# Patient Record
Sex: FEMALE | Race: WHITE
[De-identification: names, ages, dates, MRNs, and addresses within clinical notes are randomized per-mention and may not be internally consistent; named-entity substitution may affect disease eponyms.]

---

## 2019-08-17 ENCOUNTER — HOSPITAL ENCOUNTER (EMERGENCY)
Age: 34
Discharge: HOME | End: 2019-08-17
Payer: MEDICARE

## 2019-08-17 VITALS
HEART RATE: 82 BPM | RESPIRATION RATE: 18 BRPM | OXYGEN SATURATION: 99 % | TEMPERATURE: 98.8 F | DIASTOLIC BLOOD PRESSURE: 111 MMHG | SYSTOLIC BLOOD PRESSURE: 178 MMHG

## 2019-08-17 VITALS
RESPIRATION RATE: 18 BRPM | DIASTOLIC BLOOD PRESSURE: 79 MMHG | HEART RATE: 87 BPM | SYSTOLIC BLOOD PRESSURE: 130 MMHG | OXYGEN SATURATION: 96 %

## 2019-08-17 VITALS — DIASTOLIC BLOOD PRESSURE: 97 MMHG | SYSTOLIC BLOOD PRESSURE: 144 MMHG | OXYGEN SATURATION: 97 %

## 2019-08-17 VITALS — BODY MASS INDEX: 54.3 KG/M2

## 2019-08-17 DIAGNOSIS — Z90.49: ICD-10-CM

## 2019-08-17 DIAGNOSIS — R10.11: Primary | ICD-10-CM

## 2019-08-17 DIAGNOSIS — R11.2: ICD-10-CM

## 2019-08-17 LAB
ALANINE AMINOTRANSFER ALT/SGPT: 47 U/L (ref 13–56)
ALBUMIN SERPL-MCNC: 3.4 G/DL (ref 3.2–5)
ALKALINE PHOSPHATASE: 71 U/L (ref 45–117)
ANION GAP: 6 (ref 5–15)
AST(SGOT): 36 U/L (ref 15–37)
BUN SERPL-MCNC: 13 MG/DL (ref 7–18)
BUN/CREAT RATIO: 17.6 RATIO (ref 10–20)
CALCIUM SERPL-MCNC: 9.1 MG/DL (ref 8.5–10.1)
CARBON DIOXIDE: 27 MMOL/L (ref 21–32)
CHLORIDE: 108 MMOL/L (ref 98–107)
DEPRECATED RDW RBC: 39.8 FL (ref 35.1–43.9)
ERYTHROCYTE [DISTWIDTH] IN BLOOD: 11.9 % (ref 11.6–14.6)
EST GLOM FILT RATE - AFR AMER: 117 ML/MIN (ref 60–?)
ESTIMATED CREATININE CLEARANCE: 107.12 ML/MIN
GLOBULIN: 3.8 G/DL (ref 2.2–4.2)
GLUCOSE: 167 MG/DL (ref 74–106)
HCT VFR BLD AUTO: 42.3 % (ref 37–47)
HEMOGLOBIN: 14.8 G/DL (ref 12–15)
HGB BLD-MCNC: 14.8 G/DL (ref 12–15)
IMMATURE GRANULOCYTES COUNT: 0.07 X10^3/UL (ref 0–0)
INTERNAL QC VALIDATED?: (no result)
LIPASE: 78 U/L (ref 73–393)
MCV RBC: 92.4 FL (ref 81–99)
MEAN CORP HGB CONC: 35 G/DL (ref 32–36)
MEAN PLATELET VOL.: 10.2 FL (ref 6.2–12)
MUCOUS THREADS URNS QL MICRO: (no result) /HPF
NRBC FLAGGED BY ANALYZER: 0 % (ref 0–5)
PLATELET # BLD: 206 K/MM3 (ref 150–450)
PLATELET COUNT: 206 K/MM3 (ref 150–450)
POTASSIUM: 4.1 MMOL/L (ref 3.5–5.1)
PROT UR QL STRIP.AUTO: 15 MG/DL
RBC # BLD AUTO: 4.58 M/MM3 (ref 4.2–5.4)
RBC DISTRIBUTION WIDTH CV: 11.9 % (ref 11.6–14.6)
RBC DISTRIBUTION WIDTH SD: 39.8 FL (ref 35.1–43.9)
RBC UR QL: (no result) /HPF (ref 0–5)
SP GR UR: 1.01 (ref 1–1.03)
SQUAMOUS URNS QL MICRO: (no result) /HPF (ref 5–10)
URINE PRESERVATIVE: (no result)
WBC # BLD AUTO: 7.8 K/MM3 (ref 4.4–11)
WHITE BLOOD COUNT: 7.8 K/MM3 (ref 4.4–11)

## 2019-08-17 PROCEDURE — 96361 HYDRATE IV INFUSION ADD-ON: CPT

## 2019-08-17 PROCEDURE — A4216 STERILE WATER/SALINE, 10 ML: HCPCS

## 2019-08-17 PROCEDURE — 80053 COMPREHEN METABOLIC PANEL: CPT

## 2019-08-17 PROCEDURE — 96374 THER/PROPH/DIAG INJ IV PUSH: CPT

## 2019-08-17 PROCEDURE — 96375 TX/PRO/DX INJ NEW DRUG ADDON: CPT

## 2019-08-17 PROCEDURE — 80048 BASIC METABOLIC PNL TOTAL CA: CPT

## 2019-08-17 PROCEDURE — 99285 EMERGENCY DEPT VISIT HI MDM: CPT

## 2019-08-17 PROCEDURE — 81001 URINALYSIS AUTO W/SCOPE: CPT

## 2019-08-17 PROCEDURE — 81025 URINE PREGNANCY TEST: CPT

## 2019-08-17 PROCEDURE — 74177 CT ABD & PELVIS W/CONTRAST: CPT

## 2019-08-17 PROCEDURE — 85025 COMPLETE CBC W/AUTO DIFF WBC: CPT

## 2019-08-17 PROCEDURE — 83690 ASSAY OF LIPASE: CPT

## 2021-09-17 ENCOUNTER — ANESTHESIA EVENT (OUTPATIENT)
Dept: ENDOSCOPY | Age: 36
End: 2021-09-17
Payer: MEDICARE

## 2021-09-20 ENCOUNTER — ANESTHESIA (OUTPATIENT)
Dept: ENDOSCOPY | Age: 36
End: 2021-09-20
Payer: MEDICARE

## 2021-09-20 ENCOUNTER — HOSPITAL ENCOUNTER (OUTPATIENT)
Age: 36
Setting detail: OUTPATIENT SURGERY
Discharge: HOME | End: 2021-09-20
Attending: INTERNAL MEDICINE | Admitting: INTERNAL MEDICINE
Payer: MEDICARE

## 2021-09-20 VITALS
DIASTOLIC BLOOD PRESSURE: 73 MMHG | HEIGHT: 67 IN | WEIGHT: 293 LBS | HEART RATE: 83 BPM | SYSTOLIC BLOOD PRESSURE: 150 MMHG | OXYGEN SATURATION: 96 % | BODY MASS INDEX: 45.99 KG/M2 | RESPIRATION RATE: 79 BRPM | TEMPERATURE: 97.6 F

## 2021-09-20 VITALS
DIASTOLIC BLOOD PRESSURE: 86 MMHG | OXYGEN SATURATION: 98 % | RESPIRATION RATE: 16 BRPM | SYSTOLIC BLOOD PRESSURE: 149 MMHG

## 2021-09-20 DIAGNOSIS — R11.2 NAUSEA AND VOMITING, INTRACTABILITY OF VOMITING NOT SPECIFIED, UNSPECIFIED VOMITING TYPE: ICD-10-CM

## 2021-09-20 DIAGNOSIS — R10.9 ABDOMINAL PAIN, UNSPECIFIED ABDOMINAL LOCATION: ICD-10-CM

## 2021-09-20 LAB
GLUCOSE BLD-MCNC: 207 MG/DL (ref 70–99)
PERFORMED ON: ABNORMAL
PREGNANCY, URINE: NEGATIVE

## 2021-09-20 PROCEDURE — 6360000002 HC RX W HCPCS: Performed by: NURSE ANESTHETIST, CERTIFIED REGISTERED

## 2021-09-20 PROCEDURE — 88305 TISSUE EXAM BY PATHOLOGIST: CPT

## 2021-09-20 PROCEDURE — 6360000002 HC RX W HCPCS

## 2021-09-20 PROCEDURE — 2709999900 HC NON-CHARGEABLE SUPPLY: Performed by: INTERNAL MEDICINE

## 2021-09-20 PROCEDURE — 84703 CHORIONIC GONADOTROPIN ASSAY: CPT

## 2021-09-20 PROCEDURE — 3700000000 HC ANESTHESIA ATTENDED CARE: Performed by: INTERNAL MEDICINE

## 2021-09-20 PROCEDURE — 7100000010 HC PHASE II RECOVERY - FIRST 15 MIN: Performed by: INTERNAL MEDICINE

## 2021-09-20 PROCEDURE — 3609012400 HC EGD TRANSORAL BIOPSY SINGLE/MULTIPLE: Performed by: INTERNAL MEDICINE

## 2021-09-20 PROCEDURE — 7100000011 HC PHASE II RECOVERY - ADDTL 15 MIN: Performed by: INTERNAL MEDICINE

## 2021-09-20 PROCEDURE — 2580000003 HC RX 258: Performed by: ANESTHESIOLOGY

## 2021-09-20 RX ORDER — SUCRALFATE ORAL 1 G/10ML
SUSPENSION ORAL 2 TIMES DAILY
COMMUNITY
Start: 2021-09-09

## 2021-09-20 RX ORDER — LORATADINE 10 MG/1
10 TABLET ORAL DAILY
COMMUNITY

## 2021-09-20 RX ORDER — PREDNISONE 20 MG/1
2 TABLET ORAL DAILY
COMMUNITY
Start: 2021-09-15 | End: 2021-09-21

## 2021-09-20 RX ORDER — OLANZAPINE 10 MG/1
TABLET ORAL DAILY
COMMUNITY
Start: 2021-08-12 | End: 2022-07-19

## 2021-09-20 RX ORDER — FUROSEMIDE 20 MG/1
TABLET ORAL
COMMUNITY
Start: 2021-08-07

## 2021-09-20 RX ORDER — SYRINGE AND NEEDLE,INSULIN,1ML 31GX15/64"
SYRINGE, EMPTY DISPOSABLE MISCELLANEOUS
COMMUNITY
Start: 2021-08-24

## 2021-09-20 RX ORDER — PROPOFOL 10 MG/ML
INJECTION, EMULSION INTRAVENOUS PRN
Status: DISCONTINUED | OUTPATIENT
Start: 2021-09-20 | End: 2021-09-20 | Stop reason: SDUPTHER

## 2021-09-20 RX ORDER — SODIUM CHLORIDE, SODIUM LACTATE, POTASSIUM CHLORIDE, CALCIUM CHLORIDE 600; 310; 30; 20 MG/100ML; MG/100ML; MG/100ML; MG/100ML
INJECTION, SOLUTION INTRAVENOUS CONTINUOUS
Status: DISCONTINUED | OUTPATIENT
Start: 2021-09-20 | End: 2021-09-20 | Stop reason: HOSPADM

## 2021-09-20 RX ORDER — MIDAZOLAM HYDROCHLORIDE 1 MG/ML
2 INJECTION INTRAMUSCULAR; INTRAVENOUS ONCE
Status: COMPLETED | OUTPATIENT
Start: 2021-09-20 | End: 2021-09-20

## 2021-09-20 RX ORDER — INSULIN HUMAN 500 [IU]/ML
INJECTION, SOLUTION SUBCUTANEOUS
COMMUNITY
Start: 2021-08-31

## 2021-09-20 RX ORDER — GABAPENTIN 600 MG/1
800 TABLET ORAL 4 TIMES DAILY
COMMUNITY
Start: 2021-08-12

## 2021-09-20 RX ORDER — CELECOXIB 200 MG/1
CAPSULE ORAL 2 TIMES DAILY
COMMUNITY
Start: 2021-08-11

## 2021-09-20 RX ORDER — ROSUVASTATIN CALCIUM 40 MG/1
40 TABLET, COATED ORAL DAILY
COMMUNITY

## 2021-09-20 RX ORDER — LINAGLIPTIN AND METFORMIN HYDROCHLORIDE 2.5; 1 MG/1; MG/1
1 TABLET, FILM COATED ORAL 2 TIMES DAILY
COMMUNITY

## 2021-09-20 RX ORDER — PANTOPRAZOLE SODIUM 40 MG/1
40 TABLET, DELAYED RELEASE ORAL 2 TIMES DAILY
COMMUNITY

## 2021-09-20 RX ORDER — IPRATROPIUM BROMIDE AND ALBUTEROL SULFATE 2.5; .5 MG/3ML; MG/3ML
3 SOLUTION RESPIRATORY (INHALATION) EVERY 6 HOURS PRN
COMMUNITY

## 2021-09-20 RX ORDER — GLIPIZIDE 5 MG/1
TABLET, FILM COATED, EXTENDED RELEASE ORAL
COMMUNITY
Start: 2021-04-24

## 2021-09-20 RX ORDER — ALPRAZOLAM 2 MG/1
TABLET ORAL 2 TIMES DAILY PRN
COMMUNITY
Start: 2021-08-27 | End: 2022-07-19

## 2021-09-20 RX ORDER — VENLAFAXINE HYDROCHLORIDE 150 MG/1
150 CAPSULE, EXTENDED RELEASE ORAL DAILY
COMMUNITY
Start: 2021-08-06

## 2021-09-20 RX ORDER — LISINOPRIL AND HYDROCHLOROTHIAZIDE 25; 20 MG/1; MG/1
1 TABLET ORAL DAILY
COMMUNITY

## 2021-09-20 RX ORDER — PROMETHAZINE HYDROCHLORIDE 25 MG/1
TABLET ORAL EVERY 8 HOURS PRN
COMMUNITY
Start: 2021-09-11

## 2021-09-20 RX ORDER — INSULIN PUMP CONTROLLER
EACH MISCELLANEOUS
COMMUNITY
Start: 2021-08-23

## 2021-09-20 RX ORDER — NITROGLYCERIN 0.4 MG/1
0.4 TABLET SUBLINGUAL
COMMUNITY

## 2021-09-20 RX ORDER — BUDESONIDE AND FORMOTEROL FUMARATE DIHYDRATE 160; 4.5 UG/1; UG/1
AEROSOL RESPIRATORY (INHALATION) 2 TIMES DAILY
COMMUNITY
Start: 2021-08-27

## 2021-09-20 RX ORDER — BLOOD-GLUCOSE TRANSMITTER
EACH MISCELLANEOUS
COMMUNITY
Start: 2021-09-03

## 2021-09-20 RX ORDER — DICYCLOMINE HCL 20 MG
20 TABLET ORAL 4 TIMES DAILY PRN
COMMUNITY
Start: 2021-06-11

## 2021-09-20 RX ORDER — TIZANIDINE 4 MG/1
TABLET ORAL NIGHTLY
COMMUNITY
Start: 2021-07-23

## 2021-09-20 RX ORDER — MIDAZOLAM HYDROCHLORIDE 1 MG/ML
INJECTION INTRAMUSCULAR; INTRAVENOUS
Status: COMPLETED
Start: 2021-09-20 | End: 2021-09-20

## 2021-09-20 RX ORDER — LIDOCAINE HYDROCHLORIDE 20 MG/ML
INJECTION, SOLUTION INTRAVENOUS PRN
Status: DISCONTINUED | OUTPATIENT
Start: 2021-09-20 | End: 2021-09-20 | Stop reason: SDUPTHER

## 2021-09-20 RX ORDER — CHOLECALCIFEROL (VITAMIN D3) 125 MCG
CAPSULE ORAL NIGHTLY PRN
COMMUNITY
Start: 2021-08-06

## 2021-09-20 RX ADMIN — PROPOFOL 150 MG: 10 INJECTION, EMULSION INTRAVENOUS at 09:58

## 2021-09-20 RX ADMIN — MIDAZOLAM 2 MG: 1 INJECTION INTRAMUSCULAR; INTRAVENOUS at 09:51

## 2021-09-20 RX ADMIN — MIDAZOLAM HYDROCHLORIDE 2 MG: 1 INJECTION INTRAMUSCULAR; INTRAVENOUS at 09:51

## 2021-09-20 RX ADMIN — LIDOCAINE HYDROCHLORIDE 100 MG: 20 INJECTION, SOLUTION INTRAVENOUS at 09:57

## 2021-09-20 RX ADMIN — SODIUM CHLORIDE, POTASSIUM CHLORIDE, SODIUM LACTATE AND CALCIUM CHLORIDE: 600; 310; 30; 20 INJECTION, SOLUTION INTRAVENOUS at 09:47

## 2021-09-20 ASSESSMENT — PULMONARY FUNCTION TESTS
PIF_VALUE: 0
PIF_VALUE: 0
PIF_VALUE: 1
PIF_VALUE: 1
PIF_VALUE: 0
PIF_VALUE: 1
PIF_VALUE: 1
PIF_VALUE: 0
PIF_VALUE: 1
PIF_VALUE: 0
PIF_VALUE: 1
PIF_VALUE: 0
PIF_VALUE: 0
PIF_VALUE: 1
PIF_VALUE: 1
PIF_VALUE: 0
PIF_VALUE: 1
PIF_VALUE: 1
PIF_VALUE: 0
PIF_VALUE: 0

## 2021-09-20 ASSESSMENT — PAIN DESCRIPTION - PROGRESSION: CLINICAL_PROGRESSION: NOT CHANGED

## 2021-09-20 ASSESSMENT — PAIN - FUNCTIONAL ASSESSMENT
PAIN_FUNCTIONAL_ASSESSMENT: 0-10
PAIN_FUNCTIONAL_ASSESSMENT: PREVENTS OR INTERFERES WITH MANY ACTIVE NOT PASSIVE ACTIVITIES

## 2021-09-20 ASSESSMENT — PAIN DESCRIPTION - DESCRIPTORS: DESCRIPTORS: STABBING

## 2021-09-20 ASSESSMENT — PAIN SCALES - GENERAL: PAINLEVEL_OUTOF10: 8

## 2021-09-20 ASSESSMENT — LIFESTYLE VARIABLES: SMOKING_STATUS: 1

## 2021-09-20 ASSESSMENT — PAIN DESCRIPTION - FREQUENCY: FREQUENCY: OTHER (COMMENT)

## 2021-09-20 ASSESSMENT — PAIN DESCRIPTION - LOCATION: LOCATION: ABDOMEN

## 2021-09-20 ASSESSMENT — PAIN DESCRIPTION - PAIN TYPE: TYPE: OTHER (COMMENT)

## 2021-09-20 ASSESSMENT — PAIN DESCRIPTION - ONSET: ONSET: UNABLE TO TELL

## 2021-09-20 NOTE — ANESTHESIA PRE PROCEDURE
Department of Anesthesiology  Preprocedure Note       Name:  Mauro Kearney   Age:  31 y.o.  :  1985                                          MRN:  [id number]         Date:  2021      Surgeon: Hernando Aj):  Sincere Brown MD    Procedure: Procedure(s):  ESOPHAGOGASTRODUODENOSCOPY    Medications prior to admission:   Prior to Admission medications    Medication Sig Start Date End Date Taking? Authorizing Provider   ipratropium-albuterol (DUONEB) 0.5-2.5 (3) MG/3ML SOLN nebulizer solution Inhale 3 mLs into the lungs every 6 hours as needed   Yes Historical Provider, MD   HUMULIN R 500 UNIT/ML concentrated injection vial INJECT 300 UNITS SUBCUTANEOUSLY ONCE DAILY VIA INSULIN PUMP 21  Yes Historical Provider, MD   Insulin Disposable Pump (OMNIPOD DASH 5 PACK PODS) MISC CHANGE EVERY 2 DAYS AS DIRECTED 21  Yes Historical Provider, MD   glipiZIDE (GLUCOTROL XL) 5 MG extended release tablet TAKE 1 TABLET BY MOUTH ONCE DAILY 30 MINUTES BEFORE BREAKFAST 21  Yes Historical Provider, MD   gabapentin (NEURONTIN) 600 MG tablet Take 1 tablet by mouth 3 times daily. 21  Yes Historical Provider, MD   dicyclomine (BENTYL) 20 MG tablet Take 20 mg by mouth 4 times daily as needed 21  Yes Historical Provider, MD   Continuous Blood Gluc Transmit (DEXCOM G6 TRANSMITTER) MISC  9/3/21  Yes Historical Provider, MD   celecoxib (CELEBREX) 200 MG capsule 2 times daily 21  Yes Historical Provider, MD   VENTOLIN  (90 Base) MCG/ACT inhaler every 6 hours as needed 21  Yes Historical Provider, MD   medical marijuana Take by mouth as needed.    Yes Historical Provider, MD   venlafaxine (EFFEXOR XR) 150 MG extended release capsule daily 21   Historical Provider, MD   tiZANidine (ZANAFLEX) 4 MG tablet nightly 21   Historical Provider, MD   sucralfate (CARAFATE) 1 GM/10ML suspension 2 times daily 21   Historical Provider, MD   rosuvastatin (CRESTOR) 40 MG tablet Take 40 mg by mouth daily    Historical Provider, MD   promethazine (PHENERGAN) 25 MG tablet every 8 hours as needed 9/11/21   Historical Provider, MD   predniSONE (DELTASONE) 20 MG tablet Take 2 tablets by mouth daily 9/15/21 9/21/21  Historical Provider, MD   pantoprazole (PROTONIX) 40 MG tablet Take 40 mg by mouth 2 times daily    Historical Provider, MD   OLANZapine (ZYPREXA) 10 MG tablet daily 8/12/21   Historical Provider, MD   nitroGLYCERIN (NITROSTAT) 0.4 MG SL tablet Place 0.4 mg under the tongue    Historical Provider, MD   melatonin 5 MG TABS tablet nightly as needed 8/6/21   Historical Provider, MD   loratadine (CLARITIN) 10 MG tablet Take 10 mg by mouth daily    Historical Provider, MD   lisinopril-hydroCHLOROthiazide (PRINZIDE;ZESTORETIC) 20-25 MG per tablet Take 1 tablet by mouth daily    Historical Provider, MD   linagliptin-metFORMIN (JENTADUETO) 2.5-1000 MG TABS Take 1 tablet by mouth 2 times daily    Historical Provider, MD   [address] [facility] [address] X 15/64\" 0.5 ML MISC USE 1 SYRINGE THREE TIMES DAILY IN CASE OF PUMP FAILURE 8/24/21   Historical Provider, MD   furosemide (LASIX) 20 MG tablet take 1/2 to 1 tablet by mouth once daily if needed 8/7/21   Historical Provider, MD   diphenhydrAMINE (SOMINEX) 25 MG tablet Take 25 mg by mouth nightly    Historical Provider, MD   SYMBICORT 160-4.5 MCG/ACT AERO 2 times daily 8/27/21   Historical Provider, MD   ALPRAZolam Donald Nasuti) 2 MG tablet 2 times daily as needed. 8/27/21   Historical Provider, MD       Current medications:    Current Facility-Administered Medications   Medication Dose Route Frequency Provider Last Rate Last Admin    lactated ringers infusion   IntraVENous Continuous Martene Steven, DO        midazolam (VERSED) injection 2 mg  2 mg IntraVENous Once Martene Steven, DO           Allergies:     Allergies   Allergen Reactions    Cefaclor Anaphylaxis     PATIENT SAYS SHE CAN TOLERATE PENICILLIN      Cephalosporins Anaphylaxis     PATIENT SAYS SHE CAN TOLERATE PENICILLIN      Haloperidol Anxiety and Other (See Comments)     * panic attacks         Haloperidol Lactate Anxiety     * panic attacks     Metoclopramide Anxiety and Other (See Comments)     Panic attack         Nabumetone Anxiety and Other (See Comments)     Panic attack      Prochlorperazine Anxiety and Other (See Comments)     Panic attack        Sumatriptan Anxiety     * panic attacks          Corticosteroids Other (See Comments)     Elevate blood sugar \"dangerously\"    Adhesive Tape Rash    Duloxetine Nausea And Vomiting    Duloxetine Hcl Nausea And Vomiting    Gabapentin Nausea And Vomiting    Lidoderm [Lidocaine] Rash     Rash with Lidoderm patch       Problem List:  There is no problem list on file for this patient.       Past Medical History:        Diagnosis Date    Anxiety     Arthritis     Asthma     Bipolar 2 disorder ([facility]     [facility]     Diabetes mellitus (HCC)     Fibromyalgia     Hyperlipidemia     Hypertension     MVP (mitral valve prolapse)     Prolonged emergence from general anesthesia     Sleep apnea     wears CPAP       Past Surgical History:        Procedure Laterality Date    BACK SURGERY       SECTION, LOW TRANSVERSE      CHOLECYSTECTOMY      ENDOSCOPY, COLON, DIAGNOSTIC      FRACTURE SURGERY      KNEE SURGERY Left        Social History:    Social History     Tobacco Use    Smoking status: Current Every Day Smoker     Packs/day: 1.00     Years: 22.00     Pack years: 22.00    Smokeless tobacco: Former User   Substance Use Topics    Alcohol use: Yes     Comment: social                                Ready to quit: Not Answered  Counseling given: Not Answered      Vital Signs (Current):   Vitals:    21 0847   BP: (!) 154/95   Pulse: 93   Resp: 18   Temp: 98.3 °F (36.8 °C)   TempSrc: Temporal   SpO2: 96%   Weight: (!) 380 lb (172.4 kg)   Height: 5' 7\" (1.702 m) BP Readings from Last 3 Encounters:   09/20/21 (!) 154/95       NPO Status: Time of last liquid consumption: 2300                        Time of last solid consumption: 2300                        Date of last liquid consumption: 09/19/21                        Date of last solid food consumption: 09/19/21    BMI:   Wt Readings from Last 3 Encounters:   09/20/21 (!) 380 lb (172.4 kg)     Body mass index is 59.52 kg/m². CBC: No results found for: WBC, RBC, HGB, HCT, MCV, RDW, PLT    CMP: No results found for: NA, K, CL, CO2, BUN, CREATININE, GFRAA, AGRATIO, LABGLOM, GLUCOSE, PROT, CALCIUM, BILITOT, ALKPHOS, AST, ALT    POC Tests: No results for input(s): POCGLU, POCNA, POCK, POCCL, POCBUN, POCHEMO, POCHCT in the last 72 hours. Coags: No results found for: PROTIME, INR, APTT    HCG (If Applicable):   Lab Results   Component Value Date    PREGTESTUR Negative 09/20/2021        ABGs: No results found for: PHART, PO2ART, LSH1KSY, GBF3PYT, BEART, W5VSTJGI     Type & Screen (If Applicable):  No results found for: LABABO, LABRH    Drug/Infectious Status (If Applicable):  No results found for: HIV, HEPCAB    COVID-19 Screening (If Applicable): No results found for: COVID19        Anesthesia Evaluation  Patient summary reviewed and Nursing notes reviewed no history of anesthetic complications:   Airway: Mallampati: II  TM distance: >3 FB   Neck ROM: limited  Mouth opening: > = 3 FB Dental:          Pulmonary:   (+) COPD:  sleep apnea: on CPAP,  current smoker                           Cardiovascular:    (+) hypertension:,         Rhythm: regular  Rate: normal                    Neuro/Psych:                ROS comment: Bipolar disorder GI/Hepatic/Renal:   (+) morbid obesity         ROS comment: N/V . Endo/Other:    (+) DiabetesType II DM, using insulin, . Abdominal:             Vascular:           Other Findings:             Anesthesia Plan      MAC     ASA 3       Induction: intravenous. MIPS: Prophylactic antiemetics administered. Anesthetic plan and risks discussed with patient. Plan discussed with CRNA.                   Yisel Jackson,    9/20/2021

## 2021-09-20 NOTE — H&P
History and Physical / Pre-Sedation Assessment    Patient:  Ernie Saez   :   1985     Intended Procedure:  EGD    HPI: Nausea, Vomiting, Abd pain    Nurses notes reviewed and agreed. Medications reviewed  Allergies: Allergies   Allergen Reactions    Cefaclor Anaphylaxis     PATIENT SAYS SHE CAN TOLERATE PENICILLIN      Cephalosporins Anaphylaxis     PATIENT SAYS SHE CAN TOLERATE PENICILLIN      Haloperidol Anxiety and Other (See Comments)     * panic attacks         Haloperidol Lactate Anxiety     * panic attacks     Metoclopramide Anxiety and Other (See Comments)     Panic attack         Nabumetone Anxiety and Other (See Comments)     Panic attack      Prochlorperazine Anxiety and Other (See Comments)     Panic attack        Sumatriptan Anxiety     * panic attacks          Corticosteroids Other (See Comments)     Elevate blood sugar \"dangerously\"    Adhesive Tape Rash    Duloxetine Nausea And Vomiting    Duloxetine Hcl Nausea And Vomiting    Gabapentin Nausea And Vomiting    Lidoderm [Lidocaine] Rash     Rash with Lidoderm patch       Physical Exam:  Vital Signs: BP (!) 154/95   Pulse 93   Temp 98.3 °F (36.8 °C) (Temporal)   Resp 18   Ht 5' 7\" (1.702 m)   Wt (!) 380 lb (172.4 kg)   SpO2 96%   BMI 59.52 kg/m²    Airway: Mallampati: III (soft palate, base of uvula visible)  Pulmonary:Normal  Cardiac:Normal  Abdomen:Abnormal  Surgery scars of TL, C/S and GB surgery    Pre-Procedure Assessment / Plan:  ASA: Class 3 - A patient with severe systemic disease that limits activity but is not incapacitating  Level of Sedation Plan: per anesthesia  Post Procedure plan: Return to same level of care    I assessed the patient and find that the patient is in satisfactory condition to proceed with the planned procedure and sedation plan. I have explained the risk, benefits, and alternatives to the procedure; the patient understands and agrees to proceed.        Jasson Smith, MD  9/20/2021

## 2021-09-20 NOTE — PROGRESS NOTES
Ambulatory Surgery/Procedure Discharge Note    Vitals:    09/20/21 1047   BP: (!) 150/73   Pulse: 83   Resp: (!) 79   Temp:    SpO2: 96%       In: 300 [I.V.:300]  Out: -     Restroom use offered before discharge. Yes    Pain assessment:  present - adequately treated  Pain Level: 8        Patient discharged to home/self care. Patient discharged via wheel chair by transporter to waiting family/S.O.       9/20/2021     Pt. Tolerated procedure well. Denies pain &/or nausea post procedure. Discharge instructions reviewed with patient verbalized understanding and have a written copy. Patient left via wheelchair to go home with family member via land transportation.

## 2021-09-20 NOTE — BRIEF OP NOTE
Brief Postoperative Note      Patient: Kimberly Neumann  YOB: 1985  MRN: [id number]    Date of Procedure: 9/20/2021    Pre-Op Diagnosis: Nausea and vomiting, intractability of vomiting not specified, unspecified vomiting type [R11.2] Abdominal pain, unspecified abdominal location [R10.9]    Post-Op Diagnosis: ?  Short Odom's, Gastritis       Procedure(s):  ESOPHAGOGASTRODUODENOSCOPY    Surgeon(s):  Jose A Munoz MD    Assistant:  * No surgical staff found *    Anesthesia: Monitor Anesthesia Care    Estimated Blood Loss (mL): Minimal    Complications: None    Specimens:   ID Type Source Tests Collected by Time Destination   A : antrum r/o HP  Antrum Antrum SURGICAL PATHOLOGY Jose A Munoz MD 9/20/2021 1003        Implants:  * No implants in log *      Drains: * No LDAs found *    Findings: As above, Dictated    Electronically signed by Jose A Munoz MD on 9/20/2021 at 10:05 AM

## 2021-09-20 NOTE — ANESTHESIA POSTPROCEDURE EVALUATION
Department of Anesthesiology  Postprocedure Note    Patient: Heather Nava  MRN: [id number]  [place]: 1985  Date of evaluation: 9/20/2021  Time:  12:15 PM     Procedure Summary     Date: 09/20/21 Room / Location: [facility]    Anesthesia Start: 4281 Anesthesia Stop:     Procedure: ESOPHAGOGASTRODUODENOSCOPY (N/A ) Diagnosis:       Nausea and vomiting, intractability of vomiting not specified, unspecified vomiting type      Abdominal pain, unspecified abdominal location      (Nausea and vomiting, intractability of vomiting not specified, unspecified vomiting type [R11.2] Abdominal pain, unspecified abdominal location [R10.9])    Surgeons: Ann Marie Jewell MD Responsible Provider: Cleveland Amado DO    Anesthesia Type: MAC ASA Status: 3          Anesthesia Type: No value filed. Marissa Phase I: Marissa Score: 10    Marissa Phase II: Marissa Score: 10    Last vitals: Reviewed and per EMR flowsheets.        Anesthesia Post Evaluation    Patient location during evaluation: PACU  Patient participation: complete - patient participated  Level of consciousness: awake and alert  Airway patency: patent  Nausea & Vomiting: no nausea and no vomiting  Cardiovascular status: blood pressure returned to baseline  Respiratory status: acceptable  Hydration status: euvolemic

## 2021-09-21 NOTE — PROCEDURES
[address]               [address] [facility], [address] [place] [address] Ave                                 PROCEDURE NOTE    PATIENT NAME: Darshana Núñeztingham                    :        1985  MED REC NO:   [id number]                          ROOM:  ACCOUNT NO:   [de-identified]                           ADMIT DATE: 2021  PROVIDER:     Cruz Adam MD    DATE OF PROCEDURE:  2021    ENDOSCOPY REPORT    She was an outpatient. PROCEDURE PERFORMED:  EGD with biopsies x2 and pictures. SURGEON:  Cruz Adam MD    INDICATION FOR PROCEDURE:  This is a rather anxious 62-year-old white  female who recently saw me in the GI Clinic with her main symptoms of  central abdominal pain and right upper quadrant pain, recurrent episodes  of nausea and vomiting who gives a prior history of gastroparesis  possibly related to diabetes which has been there since she was a  teenager. ANALGESIA:  Analgesia was provided by Anesthesia service given her  significant anxiety, number of medications she takes, pathological  obesity with BMI over 54. Please see their note. PROCEDURE NOTE:  She had given informed consent earlier and was  monitored in the standard fashion and did well. EGD NOTE:  In a fasting state, in the left lateral decubitus position,  an upper GI Olympus video endoscope was advanced from the oropharynx to  the second portion of the duodenum. There was no major difficulty doing  it. There are questionable changes suggestive of Odom's esophagus,  so biopsies were done from the distal esophagus. The patient has a long  history of acid reflux-type symptoms, but no acute or active esophagitis  was seen at this time, that may be because she has been on pantoprazole  chronically. The stomach did, however, show moderate gastritis and  biopsies were done to the body and the antrum. No peptic ulcer was  seen.   Pylorus was symmetrically and widely opened. The duodenum was  normal.  Retroflexed view of the fundus did not show any significant  change. Estimated blood loss during the procedure is less than 2 mL. The patient tolerated the exam well. IMPRESSION:  1. Questionable short segment Odom's esophagus. Pictures taken. Biopsies done and awaited. No active esophagitis or hiatal hernia. 2.  Mild-to-moderate gastritis involving antrum and body. Biopsies done  and awaited. No peptic ulcer in the stomach. Inflammatory changes are  probably due to use of Celebrex. 3.  History of gastroparesis. Stomach seems to be moving well. No  bezoar. No obstruction. 4.  Normal duodenum. PLAN:  Above findings were explained to the patient when she was awake. Findings were discussed with her  and will be communicated to her  PCP, Dr. Carin Garza in [address] [place] [address]. At present, she is on pantoprazole 40  mg daily which I would suggest be continued. The biopsies may throw  some further light and if she has Helicobacter pylori, she will be  treated appropriately. She does give a history of regular marijuana  use, but her symptoms apparently antedate use of marijuana. A repeat  gastric emptying scan may also be in order. I will see her in the GI  Clinic in [place] in about two weeks time to sort these issues out. As mentioned, estimated blood loss during the procedure was less than 2  mL.         Rajni Kennedy MD    D: 09/20/2021 18:03:28       T: 09/21/2021 1:28:49     [id number]  Job#: [id number]     Doc#: [id number]    CC:  Todd Joseph Md

## 2022-07-19 ENCOUNTER — HOSPITAL ENCOUNTER (EMERGENCY)
Age: 37
Discharge: HOME | End: 2022-07-19
Attending: EMERGENCY MEDICINE
Payer: MEDICARE

## 2022-07-19 ENCOUNTER — APPOINTMENT (OUTPATIENT)
Dept: GENERAL RADIOLOGY | Age: 37
End: 2022-07-19
Payer: MEDICARE

## 2022-07-19 VITALS
WEIGHT: 293 LBS | DIASTOLIC BLOOD PRESSURE: 70 MMHG | OXYGEN SATURATION: 96 % | HEART RATE: 102 BPM | TEMPERATURE: 99.1 F | SYSTOLIC BLOOD PRESSURE: 106 MMHG | HEIGHT: 67 IN | RESPIRATION RATE: 16 BRPM | BODY MASS INDEX: 45.99 KG/M2

## 2022-07-19 DIAGNOSIS — R10.84 GENERALIZED ABDOMINAL PAIN: Primary | ICD-10-CM

## 2022-07-19 DIAGNOSIS — R19.7 DIARRHEA, UNSPECIFIED TYPE: ICD-10-CM

## 2022-07-19 LAB
A/G RATIO: 1.4 (ref 1.1–2.2)
ALBUMIN SERPL-MCNC: 5 G/DL (ref 3.4–5)
ALP BLD-CCNC: 76 U/L (ref 40–129)
ALT SERPL-CCNC: 44 U/L (ref 10–40)
ANION GAP SERPL CALCULATED.3IONS-SCNC: 14 MMOL/L (ref 3–16)
AST SERPL-CCNC: 37 U/L (ref 15–37)
BACTERIA: ABNORMAL /HPF
BASOPHILS ABSOLUTE: 0 K/UL (ref 0–0.2)
BASOPHILS RELATIVE PERCENT: 0.3 %
BILIRUB SERPL-MCNC: 1.8 MG/DL (ref 0–1)
BILIRUBIN URINE: NEGATIVE
BLOOD, URINE: NEGATIVE
BUN BLDV-MCNC: 10 MG/DL (ref 7–20)
CALCIUM SERPL-MCNC: 10.7 MG/DL (ref 8.3–10.6)
CHLORIDE BLD-SCNC: 94 MMOL/L (ref 99–110)
CLARITY: CLEAR
CO2: 25 MMOL/L (ref 21–32)
COLOR: YELLOW
CREAT SERPL-MCNC: 0.6 MG/DL (ref 0.6–1.1)
EKG ATRIAL RATE: 114 BPM
EKG DIAGNOSIS: NORMAL
EKG P AXIS: 62 DEGREES
EKG P-R INTERVAL: 168 MS
EKG Q-T INTERVAL: 340 MS
EKG QRS DURATION: 78 MS
EKG QTC CALCULATION (BAZETT): 468 MS
EKG R AXIS: -12 DEGREES
EKG T AXIS: 65 DEGREES
EKG VENTRICULAR RATE: 114 BPM
EOSINOPHILS ABSOLUTE: 0.3 K/UL (ref 0–0.6)
EOSINOPHILS RELATIVE PERCENT: 3.1 %
EPITHELIAL CELLS, UA: ABNORMAL /HPF (ref 0–5)
GFR AFRICAN AMERICAN: >60
GFR NON-AFRICAN AMERICAN: >60
GLUCOSE BLD-MCNC: 255 MG/DL (ref 70–99)
GLUCOSE URINE: >=1000 MG/DL
HCG QUALITATIVE: NEGATIVE
HCT VFR BLD CALC: 44.8 % (ref 36–48)
HEMOGLOBIN: 15.9 G/DL (ref 12–16)
KETONES, URINE: 15 MG/DL
LACTIC ACID: 2 MMOL/L (ref 0.4–2)
LEUKOCYTE ESTERASE, URINE: NEGATIVE
LIPASE: 16 U/L (ref 13–60)
LYMPHOCYTES ABSOLUTE: 2.5 K/UL (ref 1–5.1)
LYMPHOCYTES RELATIVE PERCENT: 30.3 %
MCH RBC QN AUTO: 31.9 PG (ref 26–34)
MCHC RBC AUTO-ENTMCNC: 35.5 G/DL (ref 31–36)
MCV RBC AUTO: 89.8 FL (ref 80–100)
MICROSCOPIC EXAMINATION: ABNORMAL
MONOCYTES ABSOLUTE: 0.5 K/UL (ref 0–1.3)
MONOCYTES RELATIVE PERCENT: 6 %
MUCUS: ABNORMAL /LPF
NEUTROPHILS ABSOLUTE: 5 K/UL (ref 1.7–7.7)
NEUTROPHILS RELATIVE PERCENT: 60.3 %
NITRITE, URINE: NEGATIVE
PDW BLD-RTO: 14 % (ref 12.4–15.4)
PH UA: 6.5 (ref 5–8)
PLATELET # BLD: 233 K/UL (ref 135–450)
PMV BLD AUTO: 8.7 FL (ref 5–10.5)
POTASSIUM REFLEX MAGNESIUM: 3.9 MMOL/L (ref 3.5–5.1)
PRO-BNP: 10 PG/ML (ref 0–124)
PROTEIN UA: NEGATIVE MG/DL
RBC # BLD: 4.98 M/UL (ref 4–5.2)
RBC UA: ABNORMAL /HPF (ref 0–4)
SODIUM BLD-SCNC: 133 MMOL/L (ref 136–145)
SPECIFIC GRAVITY UA: 1.01 (ref 1–1.03)
TOTAL PROTEIN: 8.6 G/DL (ref 6.4–8.2)
TROPONIN: <0.01 NG/ML
URINE TYPE: ABNORMAL
UROBILINOGEN, URINE: 1 E.U./DL
WBC # BLD: 8.2 K/UL (ref 4–11)
WBC UA: ABNORMAL /HPF (ref 0–5)

## 2022-07-19 PROCEDURE — 84703 CHORIONIC GONADOTROPIN ASSAY: CPT

## 2022-07-19 PROCEDURE — 93005 ELECTROCARDIOGRAM TRACING: CPT | Performed by: NURSE PRACTITIONER

## 2022-07-19 PROCEDURE — 93010 ELECTROCARDIOGRAM REPORT: CPT | Performed by: INTERNAL MEDICINE

## 2022-07-19 PROCEDURE — 99285 EMERGENCY DEPT VISIT HI MDM: CPT

## 2022-07-19 PROCEDURE — 83880 ASSAY OF NATRIURETIC PEPTIDE: CPT

## 2022-07-19 PROCEDURE — 96376 TX/PRO/DX INJ SAME DRUG ADON: CPT

## 2022-07-19 PROCEDURE — 36415 COLL VENOUS BLD VENIPUNCTURE: CPT

## 2022-07-19 PROCEDURE — 85025 COMPLETE CBC W/AUTO DIFF WBC: CPT

## 2022-07-19 PROCEDURE — 84484 ASSAY OF TROPONIN QUANT: CPT

## 2022-07-19 PROCEDURE — 2580000003 HC RX 258: Performed by: NURSE PRACTITIONER

## 2022-07-19 PROCEDURE — 83605 ASSAY OF LACTIC ACID: CPT

## 2022-07-19 PROCEDURE — 96375 TX/PRO/DX INJ NEW DRUG ADDON: CPT

## 2022-07-19 PROCEDURE — 6360000002 HC RX W HCPCS: Performed by: NURSE PRACTITIONER

## 2022-07-19 PROCEDURE — 81001 URINALYSIS AUTO W/SCOPE: CPT

## 2022-07-19 PROCEDURE — 83690 ASSAY OF LIPASE: CPT

## 2022-07-19 PROCEDURE — 71045 X-RAY EXAM CHEST 1 VIEW: CPT

## 2022-07-19 PROCEDURE — 96374 THER/PROPH/DIAG INJ IV PUSH: CPT

## 2022-07-19 PROCEDURE — 80053 COMPREHEN METABOLIC PANEL: CPT

## 2022-07-19 PROCEDURE — 6370000000 HC RX 637 (ALT 250 FOR IP): Performed by: NURSE PRACTITIONER

## 2022-07-19 RX ORDER — ONDANSETRON 4 MG/1
4 TABLET, ORALLY DISINTEGRATING ORAL EVERY 8 HOURS PRN
Qty: 6 TABLET | Refills: 0 | Status: SHIPPED | OUTPATIENT
Start: 2022-07-19

## 2022-07-19 RX ORDER — VENLAFAXINE 75 MG/1
75 TABLET ORAL 3 TIMES DAILY
COMMUNITY

## 2022-07-19 RX ORDER — ONDANSETRON 2 MG/ML
4 INJECTION INTRAMUSCULAR; INTRAVENOUS ONCE
Status: COMPLETED | OUTPATIENT
Start: 2022-07-19 | End: 2022-07-19

## 2022-07-19 RX ORDER — MORPHINE SULFATE 4 MG/ML
4 INJECTION, SOLUTION INTRAMUSCULAR; INTRAVENOUS ONCE
Status: COMPLETED | OUTPATIENT
Start: 2022-07-19 | End: 2022-07-19

## 2022-07-19 RX ORDER — HYDROCODONE BITARTRATE AND ACETAMINOPHEN 5; 325 MG/1; MG/1
1 TABLET ORAL ONCE
Status: COMPLETED | OUTPATIENT
Start: 2022-07-19 | End: 2022-07-19

## 2022-07-19 RX ORDER — 0.9 % SODIUM CHLORIDE 0.9 %
1000 INTRAVENOUS SOLUTION INTRAVENOUS ONCE
Status: COMPLETED | OUTPATIENT
Start: 2022-07-19 | End: 2022-07-19

## 2022-07-19 RX ORDER — ONDANSETRON 2 MG/ML
4 INJECTION INTRAMUSCULAR; INTRAVENOUS EVERY 6 HOURS PRN
Status: DISCONTINUED | OUTPATIENT
Start: 2022-07-19 | End: 2022-07-19 | Stop reason: HOSPADM

## 2022-07-19 RX ADMIN — SODIUM CHLORIDE 1000 ML: 9 INJECTION, SOLUTION INTRAVENOUS at 19:27

## 2022-07-19 RX ADMIN — ONDANSETRON HYDROCHLORIDE 4 MG: 2 INJECTION, SOLUTION INTRAMUSCULAR; INTRAVENOUS at 19:28

## 2022-07-19 RX ADMIN — HYDROCODONE BITARTRATE AND ACETAMINOPHEN 1 TABLET: 5; 325 TABLET ORAL at 20:17

## 2022-07-19 RX ADMIN — MORPHINE SULFATE 4 MG: 4 INJECTION INTRAVENOUS at 19:28

## 2022-07-19 RX ADMIN — ONDANSETRON HYDROCHLORIDE 4 MG: 2 INJECTION, SOLUTION INTRAMUSCULAR; INTRAVENOUS at 20:19

## 2022-07-19 ASSESSMENT — ENCOUNTER SYMPTOMS
VOMITING: 0
BLOOD IN STOOL: 0
BACK PAIN: 0
SHORTNESS OF BREATH: 0
SORE THROAT: 0
COUGH: 0
RHINORRHEA: 0
EYE PAIN: 0
ABDOMINAL PAIN: 1
NAUSEA: 0
DIARRHEA: 0

## 2022-07-19 ASSESSMENT — PAIN SCALES - GENERAL
PAINLEVEL_OUTOF10: 8
PAINLEVEL_OUTOF10: 9

## 2022-07-19 ASSESSMENT — PAIN - FUNCTIONAL ASSESSMENT: PAIN_FUNCTIONAL_ASSESSMENT: 0-10

## 2022-07-19 ASSESSMENT — PAIN DESCRIPTION - LOCATION: LOCATION: ABDOMEN

## 2022-07-19 NOTE — ED PROVIDER NOTES
I independently performed a history and physical on Noemi Joe. All diagnostic, treatment, and disposition decisions were made by myself in conjunction with the advanced practice provider. For further details of [address] [place] [address] emergency department encounter, please see Delmer Quiñonez NP's documentation. Patient reports has had 3 weeks of vomiting and diarrhea off and on. She has been to the ER \"multiple times\" for this. She also complains of intermittent fevers but none at this time. No chest pain or shortness of breath. She reports vomiting and diarrhea are both not bloody nor black. She states she has had CAT scans and other studies done but this was all done at facilities that do not share records with [facility]. She reports the pain is primarily on the right side of the abdomen and indicates right upper quadrant and right flank. No chest pain. She is status post cholecystectomy. On exam patient is morbidly obese and has bulging of the abdominal wall in the area indicating by her pain. This is consistent with some weakening of the abdominal wall/ventral hernia in my opinion. There is no incarcerated hernia. EKG  The Ekg interpreted by me shows  sinus tachycardia, nmlb=180  Axis is   Normal  QTc is  normal  Intervals and Durations are unremarkable. ST Segments: normal  No prior EKG available for comparison.   Results for orders placed or performed during the hospital encounter of 07/19/22   CBC with Auto Differential   Result Value Ref Range    WBC 8.2 4.0 - 11.0 K/uL    RBC 4.98 4.00 - 5.20 M/uL    Hemoglobin 15.9 12.0 - 16.0 g/dL    Hematocrit 44.8 36.0 - 48.0 %    MCV 89.8 80.0 - 100.0 fL    MCH 31.9 26.0 - 34.0 pg    MCHC 35.5 31.0 - 36.0 g/dL    RDW 14.0 12.4 - 15.4 %    Platelets 885 452 - 186 K/uL    MPV 8.7 5.0 - 10.5 fL    Neutrophils % 60.3 %    Lymphocytes % 30.3 %    Monocytes % 6.0 %    Eosinophils % 3.1 %    Basophils % 0.3 %    Neutrophils Absolute 5.0 1.7 - 7.7 K/uL    Lymphocytes Absolute 2.5 1.0 - 5.1 K/uL    Monocytes Absolute 0.5 0.0 - 1.3 K/uL    Eosinophils Absolute 0.3 0.0 - 0.6 K/uL    Basophils Absolute 0.0 0.0 - 0.2 K/uL   Comprehensive Metabolic Panel w/ Reflex to MG   Result Value Ref Range    Sodium 133 (L) 136 - 145 mmol/L    Potassium reflex Magnesium 3.9 3.5 - 5.1 mmol/L    Chloride 94 (L) 99 - 110 mmol/L    CO2 25 21 - 32 mmol/L    Anion Gap 14 3 - 16    Glucose 255 (H) 70 - 99 mg/dL    BUN 10 7 - 20 mg/dL    CREATININE 0.6 0.6 - 1.1 mg/dL    GFR Non-African American >60 >60    GFR African American >60 >60    Calcium 10.7 (H) 8.3 - 10.6 mg/dL    Total Protein 8.6 (H) 6.4 - 8.2 g/dL    Albumin 5.0 3.4 - 5.0 g/dL    Albumin/Globulin Ratio 1.4 1.1 - 2.2    Total Bilirubin 1.8 (H) 0.0 - 1.0 mg/dL    Alkaline Phosphatase 76 40 - 129 U/L    ALT 44 (H) 10 - 40 U/L    AST 37 15 - 37 U/L   Troponin   Result Value Ref Range    Troponin <0.01 <0.01 ng/mL   Brain Natriuretic Peptide   Result Value Ref Range    Pro-BNP 10 0 - 124 pg/mL   Lipase   Result Value Ref Range    Lipase 16.0 13.0 - 60.0 U/L   HCG Qualitative, Serum   Result Value Ref Range    hCG Qual Negative Detects HCG level >10 MIU/mL   Lactic Acid   Result Value Ref Range    Lactic Acid 2.0 0.4 - 2.0 mmol/L   Urinalysis with Microscopic   Result Value Ref Range    Color, UA Yellow Straw/Yellow    Clarity, UA Clear Clear    Glucose, Ur >=1000 (A) Negative mg/dL    Bilirubin Urine Negative Negative    Ketones, Urine 15 (A) Negative mg/dL    Specific Gravity, UA 1.010 1.005 - 1.030    Blood, Urine Negative Negative    pH, UA 6.5 5.0 - 8.0    Protein, UA Negative Negative mg/dL    Urobilinogen, Urine 1.0 <2.0 E.U./dL    Nitrite, Urine Negative Negative    Leukocyte Esterase, Urine Negative Negative    Microscopic Examination Not Indicated     Urine Type NotGiven     Mucus, UA Rare (A) None Seen /LPF    WBC, UA 0-2 0 - 5 /HPF    RBC, UA 0-2 0 - 4 /HPF    Epithelial Cells, UA 0-1 0 - 5 /HPF    Bacteria, UA Rare (A) None Seen /HPF   EKG 12 Lead   Result Value Ref Range    Ventricular Rate 114 BPM    Atrial Rate 114 BPM    P-R Interval 168 ms    QRS Duration 78 ms    Q-T Interval 340 ms    QTc Calculation (Bazett) 468 ms    P Axis 62 degrees    R Axis -12 degrees    T Axis 65 degrees    Diagnosis       Sinus tachycardiaInferior infarct , age undeterminedCannot rule out Anterior infarct , age undeterminedAbnormal ECGNo previous ECGs availableConfirmed by Lizzy Carney MD, [address] (1984) on 7/19/2022 7:49:40 PM     XR CHEST PORTABLE    Result Date: 7/19/2022  EXAMINATION: ONE XRAY VIEW OF THE CHEST 7/19/2022 7:11 pm COMPARISON: None. HISTORY: ORDERING SYSTEM PROVIDED HISTORY: Right upper quadrant abdominal pain TECHNOLOGIST PROVIDED HISTORY: Reason for exam:->Right upper quadrant abdominal pain Reason for Exam: Right upper quadrant abdominal pain FINDINGS: Portable study is limited by obesity. Heart size and configuration are within normal limits and the lungs are clear. No pneumothorax or pleural fluid. No acute bone finding. No acute cardiopulmonary disease. I personally saw this patient and performed a substantive portion of the visit including all aspects of the medical decision making. MDM  I believe the patient's pain is likely secondary to a broad-based ventral hernia. Regarding her vomiting and diarrhea I believe she would benefit from GI consultation. At this time her labs are mostly within normal limits I believe she is safe for discharge. I personally saw this patient and independently provided 10 minutes of non-concurrent critical care out of the total shared critical care time provided.        Austen Wei MD  07/19/22 5993

## 2022-07-19 NOTE — ED PROVIDER NOTES
[facility]        Pt Name: Lorrine Lesch  MRN: [id number]  [place] 1985  Date of evaluation: 7/19/2022  Provider: LIZETTE Hassan - CNP  PCP: Lizzy Massey MD  Note Started: 6:50 PM EDT      GABY. I have evaluated this patient. My supervising physician was available for consultation. Triage CHIEF COMPLAINT       Chief Complaint   Patient presents with    Abdominal Pain     Pt having abd pain for the past month or more. States that she was recently diagnosed with cirrhosis from her fatty liver. Pt having RUQ pain today. HISTORY OF PRESENT ILLNESS   (Location/Symptom, Timing/Onset, Context/Setting, Quality, Duration, Modifying Factors, Severity)  Note limiting factors. Chief Complaint: Abdominal pain for 1 month    Lorrine Lesch is a 31 y.o. female who presents to the [facility] from with symptoms of nausea and abdominal pain for approximately 1 month. Does have a history of fatty liver disease. Reported that she is also had a history of cholecystectomy. Her pain is located in the upper abdomen. Denies cough or congestion. States that she felt feverish. Denies any symptoms of neck pain or back pain. No cough or congestion. No chest pain. Denies any lower abdominal pain. No vaginal bleeding or discharge. Nursing Notes were all reviewed and agreed with or any disagreements were addressed in the HPI. REVIEW OF SYSTEMS    (2-9 systems for level 4, 10 or more for level 5)     Review of Systems   Constitutional:  Negative for chills, diaphoresis and fever. HENT:  Negative for congestion, ear pain, rhinorrhea and sore throat. Eyes:  Negative for pain and visual disturbance. Respiratory:  Negative for cough and shortness of breath. Cardiovascular:  Negative for chest pain and leg swelling. Gastrointestinal:  Positive for abdominal pain. Negative for blood in stool, diarrhea, nausea and vomiting.    Genitourinary: Negative for difficulty urinating, dysuria, flank pain and frequency. Musculoskeletal:  Negative for back pain and neck pain. Skin:  Negative for rash and wound. Neurological:  Negative for dizziness and light-headedness. PAST MEDICAL HISTORY     Past Medical History:   Diagnosis Date    Anxiety     Arthritis     Asthma     Bipolar 2 disorder (HCC)     COPD (chronic obstructive pulmonary disease) (HCC)     Diabetes mellitus (HCC)     Fibromyalgia     Hyperlipidemia     Hypertension     MVP (mitral valve prolapse)     Prolonged emergence from general anesthesia     Sleep apnea     wears CPAP       SURGICAL HISTORY     Past Surgical History:   Procedure Laterality Date    BACK SURGERY       SECTION, LOW TRANSVERSE      CHOLECYSTECTOMY      ENDOSCOPY, COLON, DIAGNOSTIC      FRACTURE SURGERY      KNEE SURGERY Left     UPPER GASTROINTESTINAL ENDOSCOPY N/A 2021    ESOPHAGOGASTRODUODENOSCOPY performed by Bibi Sosa MD at [address] [place] [address]       Discharge Medication List as of 2022  8:37 PM        CONTINUE these medications which have NOT CHANGED    Details   venlafaxine (EFFEXOR) 75 MG tablet Take 75 mg by mouth in the morning and 75 mg at noon and 75 mg before bedtime. Historical Med      venlafaxine (EFFEXOR XR) 150 MG extended release capsule 150 mg  in the morning. Historical Med      tiZANidine (ZANAFLEX) 4 MG tablet nightlyHistorical Med      sucralfate (CARAFATE) 1 GM/10ML suspension 2 times dailyHistorical Med      rosuvastatin (CRESTOR) 40 MG tablet Take 40 mg by mouth dailyHistorical Med      promethazine (PHENERGAN) 25 MG tablet every 8 hours as neededHistorical Med      pantoprazole (PROTONIX) 40 MG tablet Take 40 mg by mouth 2 times dailyHistorical Med      nitroGLYCERIN (NITROSTAT) 0.4 MG SL tablet Place 0.4 mg under the tongueHistorical Med      melatonin 5 MG TABS tablet nightly as neededHistorical Med      loratadine (CLARITIN) 10 MG tablet Take 10 mg by mouth dailyHistorical Med      lisinopril-hydroCHLOROthiazide (PRINZIDE;ZESTORETIC) 20-25 MG per tablet Take 1 tablet by mouth dailyHistorical Med      linagliptin-metFORMIN (JENTADUETO) 2.5-1000 MG TABS Take 1 tablet by mouth 2 times dailyHistorical Med      ipratropium-albuterol (DUONEB) 0.5-2.5 (3) MG/3ML SOLN nebulizer solution Inhale 3 mLs into the lungs every 6 hours as neededHistorical Med      RELION INSULIN SYRINGE 31G X 15/64\" 0.5 ML MISC USE 1 SYRINGE THREE TIMES DAILY IN CASE OF PUMP FAILURE, DAWHistorical Med      HUMULIN R 500 UNIT/ML concentrated injection vial INJECT 300 UNITS SUBCUTANEOUSLY ONCE DAILY VIA INSULIN PUMP, DAWHistorical Med      Insulin Disposable Pump (OMNIPOD DASH 5 PACK PODS) MISC CHANGE EVERY 2 DAYS AS DIRECTEDHistorical Med      glipiZIDE (GLUCOTROL XL) 5 MG extended release tablet TAKE 1 TABLET BY MOUTH ONCE DAILY 30 MINUTES BEFORE BREAKFASTHistorical Med      gabapentin (NEURONTIN) 600 MG tablet Take 800 mg by mouth in the morning and 800 mg at noon and 800 mg in the evening and 800 mg before bedtime. Historical Med      furosemide (LASIX) 20 MG tablet take 1/2 to 1 tablet by mouth once daily if neededHistorical Med      diphenhydrAMINE (SOMINEX) 25 MG tablet Take 25 mg by mouth nightlyHistorical Med      dicyclomine (BENTYL) 20 MG tablet Take 20 mg by mouth 4 times daily as neededHistorical Med      Continuous Blood Gluc Transmit (DEXCOM G6 TRANSMITTER) MISC Historical Med      celecoxib (CELEBREX) 200 MG capsule 2 times dailyHistorical Med      SYMBICORT 160-4.5 MCG/ACT AERO 2 times daily, DAWHistorical Med      VENTOLIN  (90 Base) MCG/ACT inhaler every 6 hours as needed, DAWHistorical Med      medical marijuana Take by mouth as needed. Historical Med             ALLERGIES     Cefaclor, Cephalosporins, Haloperidol, Haloperidol lactate, Metoclopramide, Nabumetone, Prochlorperazine, Sumatriptan, Corticosteroids, Adhesive tape, Duloxetine, Duloxetine hcl, Gabapentin, and Lidoderm [lidocaine]    FAMILYHISTORY     History reviewed. No pertinent family history. SOCIAL HISTORY       Social History     Socioeconomic History    Marital status:      Spouse name: None    Number of children: None    Years of education: None    Highest education level: None   Tobacco Use    Smoking status: Every Day     Packs/day: 1.00     Years: 22.00     Pack years: 22.00     Types: Cigarettes    Smokeless tobacco: Former   Vaping Use    Vaping Use: Former   Substance and Sexual Activity    Alcohol use: Yes     Comment: social    Drug use: Yes     Types: Marijuana (Weed)    Sexual activity: Yes     Partners: Male       SCREENINGS    [place] Coma Scale  Eye Opening: Spontaneous  Best Verbal Response: Oriented  Best Motor Response: Obeys commands  [place] Coma Scale Score: 15        PHYSICAL EXAM    (up to 7 for level 4, 8 or more for level 5)     ED Triage Vitals [07/19/22 1831]   BP Temp Temp Source Heart Rate Resp SpO2 Height Weight   (!) 127/90 99.1 °F (37.3 °C) Oral (!) 128 20 100 % 5' 7\" (1.702 m) (!) 370 lb (167.8 kg)       Physical Exam  Vitals and nursing note reviewed. Constitutional:       Appearance: Normal appearance. She is not toxic-appearing or diaphoretic. HENT:      Head: Normocephalic and atraumatic. Nose: Nose normal.   Eyes:      General:         Right eye: No discharge. Left eye: No discharge. Cardiovascular:      Rate and Rhythm: Regular rhythm. Tachycardia present. Heart sounds: Normal heart sounds. No murmur heard. Pulmonary:      Effort: Pulmonary effort is normal. No respiratory distress. Abdominal:      General: Abdomen is protuberant. Bowel sounds are normal. There is no distension. Palpations: Abdomen is soft. Tenderness: There is abdominal tenderness in the right upper quadrant and left upper quadrant. Musculoskeletal:         General: Normal range of motion. Cervical back: Normal range of motion and neck supple. Skin:     General: Skin is warm and dry. Neurological:      General: No focal deficit present. Mental Status: She is alert and oriented to person, place, and time. Psychiatric:         Mood and Affect: Mood normal.         Behavior: Behavior normal.       DIAGNOSTIC RESULTS   LABS:    Labs Reviewed   COMPREHENSIVE METABOLIC PANEL W/ REFLEX TO MG FOR LOW K - Abnormal; Notable for the following components:       Result Value    Sodium 133 (*)     Chloride 94 (*)     Glucose 255 (*)     Calcium 10.7 (*)     Total Protein 8.6 (*)     Total Bilirubin 1.8 (*)     ALT 44 (*)     All other components within normal limits   URINALYSIS WITH MICROSCOPIC - Abnormal; Notable for the following components:    Glucose, Ur >=1000 (*)     Ketones, Urine 15 (*)     Mucus, UA Rare (*)     Bacteria, UA Rare (*)     All other components within normal limits   GASTROINTESTINAL PANEL, MOLECULAR   GIARDIA ANTIGEN   O&P PANEL (TRAVEL ASSOCIATED) #1   CBC WITH AUTO DIFFERENTIAL   TROPONIN   BRAIN NATRIURETIC PEPTIDE   LIPASE   HCG, SERUM, QUALITATIVE   LACTIC ACID   ROTAVIRUS ANTIGEN, STOOL   FECAL FAT, QUALITATIVE       When ordered, only abnormal lab results are displayed. All other labs were within normal range or not returned as of this dictation. EKG: When ordered, EKG's are interpreted by the Emergency Department Physician in the absence of a cardiologist.  Please see their note for interpretation of EKG. RADIOLOGY:   Non-plain film images such as CT, Ultrasound and MRI are read by the radiologist. Plain radiographic images are visualized andpreliminarily interpreted by the  ED Provider with the below findings:        Interpretation [facility] Radiologist below, if available at the time of this note:    XR CHEST PORTABLE   Final Result   No acute cardiopulmonary disease. No results found.       PROCEDURES   Unless otherwise noted below, none     Procedures    CRITICAL CARE TIME N/A    CONSULTS:  None      EMERGENCY DEPARTMENT COURSE and DIFFERENTIAL DIAGNOSIS/MDM:   Vitals:    Vitals:    07/19/22 1940 07/19/22 2001 07/19/22 2033 07/19/22 2034   BP: 125/78   106/70   Pulse: (!) 110 (!) 106 (!) 103 (!) 102   Resp: 18   16   Temp:       TempSrc:       SpO2: 100%   96%   Weight:       Height:           Patient was given thefollowing medications:  Medications   0.9 % sodium chloride bolus (0 mLs IntraVENous Stopped 7/19/22 2021)   ondansetron (ZOFRAN) injection 4 mg (4 mg IntraVENous Given 7/19/22 1928)   morphine sulfate (PF) injection 4 mg (4 mg IntraVENous Given 7/19/22 1928)   HYDROcodone-acetaminophen (NORCO) 5-325 MG per tablet 1 tablet (1 tablet Oral Given 7/19/22 2017)         Is this patient to be included in the SEP-1 Core Measure due to severe sepsis or septic shock? No   Exclusion criteria - the patient is NOT to be included for SEP-1 Core Measure due to: Infection is not suspected    Patient to be discharged home in good condition. She does have a history of chronic abdominal pain. Patient displays no other acute findings at this time. She denies any chest pain or back pain. Does report some intermittent abdominal pain. Her abdominal pain seems to be chronic. She has been provided follow-up with GI. She is also been advised to follow-up with her family doctor in the next 2 to 3 days. Patient advised return back to the ED for any further acute concerns. She has had a history of longstanding abdominal pain and has had a recent CT scan at outside hospital.  States that she was advised that her CT scan was normal.  Patient denies any further acute concerns at this time. Only symptoms have drastically improved and she feels well. We will go ahead and discharge patient to follow-up as an outpatient. She was provided strict return precautions. FINAL IMPRESSION      1. Generalized abdominal pain    2.  Diarrhea, unspecified type          DISPOSITION/PLAN   DISPOSITION Decision To Discharge 07/19/2022 08:40:26 PM      PATIENT REFERREDTO:  Enrico Najera MD  [place] [address] [phone]    Schedule an appointment as soon as possible for a visit       [place] [phone]    Schedule an appointment as soon as possible for a visit       DISCHARGE MEDICATIONS:  Discharge Medication List as of 7/19/2022  8:37 PM        START taking these medications    Details   ondansetron (ZOFRAN ODT) 4 MG disintegrating tablet Take 1 tablet by mouth every 8 hours as needed for Nausea, Disp-6 tablet, R-0Print             DISCONTINUED MEDICATIONS:  Discharge Medication List as of 7/19/2022  8:37 PM        STOP taking these medications       OLANZapine (ZYPREXA) 10 MG tablet Comments:   Reason for Stopping:         ALPRAZolam  Champ) 2 MG tablet Comments:   Reason for Stopping:                      (Please note that portions ofthis note were completed with a voice recognition program.  Efforts were made to edit the dictations but occasionally words are mis-transcribed.)    LIZETTE Garrett CNP (electronically signed)             LIZETTE Garrett CNP  07/25/22 1946

## 2022-10-21 ENCOUNTER — HOSPITAL ENCOUNTER (EMERGENCY)
Age: 37
Discharge: HOME | End: 2022-10-21
Attending: STUDENT IN AN ORGANIZED HEALTH CARE EDUCATION/TRAINING PROGRAM
Payer: MEDICARE

## 2022-10-21 ENCOUNTER — APPOINTMENT (OUTPATIENT)
Dept: GENERAL RADIOLOGY | Age: 37
End: 2022-10-21
Payer: MEDICARE

## 2022-10-21 ENCOUNTER — APPOINTMENT (OUTPATIENT)
Dept: CT IMAGING | Age: 37
End: 2022-10-21
Payer: MEDICARE

## 2022-10-21 VITALS
HEART RATE: 76 BPM | DIASTOLIC BLOOD PRESSURE: 110 MMHG | OXYGEN SATURATION: 97 % | TEMPERATURE: 98.4 F | RESPIRATION RATE: 16 BRPM | SYSTOLIC BLOOD PRESSURE: 133 MMHG

## 2022-10-21 DIAGNOSIS — M54.9 CHRONIC MIDLINE BACK PAIN, UNSPECIFIED BACK LOCATION: ICD-10-CM

## 2022-10-21 DIAGNOSIS — G89.29 CHRONIC MIDLINE BACK PAIN, UNSPECIFIED BACK LOCATION: ICD-10-CM
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
## 2022-10-21 DIAGNOSIS — S09.90XA CLOSED HEAD INJURY, INITIAL ENCOUNTER: ICD-10-CM

## 2022-10-21 PROCEDURE — 70450 CT HEAD/BRAIN W/O DYE: CPT

## 2022-10-21 PROCEDURE — 72070 X-RAY EXAM THORAC SPINE 2VWS: CPT

## 2022-10-21 PROCEDURE — 72040 X-RAY EXAM NECK SPINE 2-3 VW: CPT

## 2022-10-21 PROCEDURE — 99284 EMERGENCY DEPT VISIT MOD MDM: CPT

## 2022-10-21 ASSESSMENT — PAIN SCALES - GENERAL: PAINLEVEL_OUTOF10: 10

## 2022-10-21 ASSESSMENT — PAIN DESCRIPTION - LOCATION: LOCATION: NECK

## 2022-10-21 ASSESSMENT — PAIN - FUNCTIONAL ASSESSMENT: PAIN_FUNCTIONAL_ASSESSMENT: 0-10

## 2022-10-21 NOTE — ED PROVIDER NOTES
[facility] [facility]  Fall (Pt fell three days ago and landed on her hip then fell onto her back. She endorses pain that radiates from upper middle back up her neck to the back of her head.)     HISTORY OF PRESENT ILLNESS  Sonal Meza is a 30 y.o. female  who presents to the ED complaining of headache and mid to upper back pain. 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 She denies loss of consciousness but states that she has been having headache, nausea and vomiting since this began. She has multiple drug allergies and does see pain management for history of chronic back pain. She took some Tylenol and states that it has not helped. States that she cannot take NSAIDs. No other complaints, modifying factors or associated symptoms. I have reviewed the following from the nursing documentation. Past Medical History:   Diagnosis Date    Anxiety     Arthritis     Asthma     Bipolar 2 disorder (HCC)     COPD (chronic obstructive pulmonary disease) (HCC)     Diabetes mellitus (HCC)     Fibromyalgia     Hyperlipidemia     Hypertension     MVP (mitral valve prolapse)     Prolonged emergence from general anesthesia     Sleep apnea     wears CPAP     Past Surgical History:   Procedure Laterality Date    BACK SURGERY       SECTION, LOW TRANSVERSE      CHOLECYSTECTOMY      ENDOSCOPY, COLON, DIAGNOSTIC      FRACTURE SURGERY      KNEE SURGERY Left     UPPER GASTROINTESTINAL ENDOSCOPY N/A 2021    ESOPHAGOGASTRODUODENOSCOPY performed by Arcadio Wilson MD at [address] [place] [address] reviewed. No pertinent family history.   Social History     Socioeconomic History    Marital status:      Spouse name: Not on file    Number of children: Not on file    Years of education: Not on file    Highest education level: Not on file Occupational History    Not on file   Tobacco Use    Smoking status: Every Day     Packs/day: 1.00     Years: 22.00     Pack years: 22.00     Types: Cigarettes    Smokeless tobacco: Former   Vaping Use    Vaping Use: Former   Substance and Sexual Activity    Alcohol use: Not Currently    Drug use: Yes     Types: Marijuana Nan Rhonda)     Comment: medical/edible    Sexual activity: Yes     Partners: Male   Other Topics Concern    Not on file   Social History Narrative    Not on file     Social Determinants of Health     Financial Resource Strain: Not on file   Food Insecurity: Not on file   Transportation Needs: Not on file   Physical Activity: Not on file   Stress: Not on file   Social Connections: Not on file   Intimate Partner Violence: Not on file   Housing Stability: Not on file     No current facility-administered medications for this encounter. Current Outpatient Medications   Medication Sig Dispense Refill    venlafaxine (EFFEXOR) 75 MG tablet Take 75 mg by mouth in the morning and 75 mg at noon and 75 mg before bedtime. ondansetron (ZOFRAN ODT) 4 MG disintegrating tablet Take 1 tablet by mouth every 8 hours as needed for Nausea 6 tablet 0    venlafaxine (EFFEXOR XR) 150 MG extended release capsule 150 mg  in the morning.       tiZANidine (ZANAFLEX) 4 MG tablet nightly      sucralfate (CARAFATE) 1 GM/10ML suspension 2 times daily      rosuvastatin (CRESTOR) 40 MG tablet Take 40 mg by mouth daily      promethazine (PHENERGAN) 25 MG tablet every 8 hours as needed      pantoprazole (PROTONIX) 40 MG tablet Take 40 mg by mouth 2 times daily      nitroGLYCERIN (NITROSTAT) 0.4 MG SL tablet Place 0.4 mg under the tongue      melatonin 5 MG TABS tablet nightly as needed      loratadine (CLARITIN) 10 MG tablet Take 10 mg by mouth daily      lisinopril-hydroCHLOROthiazide (PRINZIDE;ZESTORETIC) 20-25 MG per tablet Take 1 tablet by mouth daily      linagliptin-metFORMIN (JENTADUETO) 2.5-1000 MG TABS Take 1 tablet by mouth 2 times daily      ipratropium-albuterol (DUONEB) 0.5-2.5 (3) MG/3ML SOLN nebulizer solution Inhale 3 mLs into the lungs every 6 hours as needed      RELION INSULIN SYRINGE 31G X 15/64\" 0.5 ML MISC USE 1 SYRINGE THREE TIMES DAILY IN CASE OF PUMP FAILURE      HUMULIN R 500 UNIT/ML concentrated injection vial INJECT 300 UNITS SUBCUTANEOUSLY ONCE DAILY VIA INSULIN PUMP      Insulin Disposable Pump (OMNIPOD DASH 5 PACK PODS) MISC CHANGE EVERY 2 DAYS AS DIRECTED      glipiZIDE (GLUCOTROL XL) 5 MG extended release tablet TAKE 1 TABLET BY MOUTH ONCE DAILY 30 MINUTES BEFORE BREAKFAST      gabapentin (NEURONTIN) 600 MG tablet Take 800 mg by mouth in the morning and 800 mg at noon and 800 mg in the evening and 800 mg before bedtime. furosemide (LASIX) 20 MG tablet take 1/2 to 1 tablet by mouth once daily if needed      diphenhydrAMINE (SOMINEX) 25 MG tablet Take 25 mg by mouth nightly      dicyclomine (BENTYL) 20 MG tablet Take 20 mg by mouth 4 times daily as needed      Continuous Blood Gluc Transmit (DEXCOM G6 TRANSMITTER) MISC       celecoxib (CELEBREX) 200 MG capsule 2 times daily      SYMBICORT 160-4.5 MCG/ACT AERO 2 times daily      VENTOLIN  (90 Base) MCG/ACT inhaler every 6 hours as needed      medical marijuana Take by mouth as needed.        Allergies   Allergen Reactions    Cefaclor Anaphylaxis     PATIENT SAYS SHE CAN TOLERATE PENICILLIN      Cephalosporins Anaphylaxis     PATIENT SAYS SHE CAN TOLERATE PENICILLIN      Haloperidol Anxiety and Other (See Comments)     * panic attacks         Haloperidol Lactate Anxiety     * panic attacks     Metoclopramide Anxiety and Other (See Comments)     Panic attack         Nabumetone Anxiety and Other (See Comments)     Panic attack      Prochlorperazine Anxiety and Other (See Comments)     Panic attack        Sumatriptan Anxiety     * panic attacks          Corticosteroids Other (See Comments)     Elevate blood sugar \"dangerously\"    Adhesive Tape Rash Duloxetine Nausea And Vomiting    Duloxetine Hcl Nausea And Vomiting    Lidoderm [Lidocaine] Rash     Rash with Lidoderm patch       REVIEW OF SYSTEMS  10 systems reviewed, pertinent positives per HPI otherwise noted to be negative. PHYSICAL EXAM  BP (!) 133/110   Pulse 76   Temp 98.4 °F (36.9 °C) (Oral)   Resp 16   SpO2 97%    GENERAL APPEARANCE: Awake and alert. Cooperative. No acute distress. HENT: Normocephalic. Atraumatic. NECK: Supple. EYES: PERRL. EOM's grossly intact. HEART/CHEST: RRR. No murmurs. LUNGS: Respirations unlabored. CTAB. Good air exchange. Speaking comfortably in full sentences. ABDOMEN: No tenderness. Soft. Non-distended. No masses. No organomegaly. No guarding or rebound. MUSCULOSKELETAL: No extremity edema. Compartments soft. No deformity. No tenderness in the extremities. All extremities neurovascularly intact. BACK: Midline tenderness to palpation throughout C-spine and upper half of T-spine. No step-offs or obvious deformities. No lumbar tenderness. No paraspinal muscle tenderness. SKIN: Warm and dry. No acute rashes. NEUROLOGICAL: Alert and oriented. CN's 2-12 intact. No gross facial drooping. Strength 5/5, sensation intact. Gait normal.  PSYCHIATRIC: Normal mood and affect. LABS  I have reviewed all labs for this visit. No results found for this visit on 10/21/22. RADIOLOGY  XR THORACIC SPINE (2 VIEWS)   Final Result   No acute fracture or subluxation. Mild spondylosis. XR CERVICAL SPINE (2-3 VIEWS)   Final Result   1. No radiographic evidence of a fracture. The spine is visible to the level   of the C6-C7 disc on the lateral view. 2. Mild anterolisthesis at the C3-C4 and C4-C5 levels which could be related   neck flexion or could be degenerative. 3. Straightening of the spine which could be related to patient positioning   or muscle spasm. CT HEAD WO CONTRAST   Final Result   No acute intracranial abnormality.            ED COURSE/MDM  Patient seen and evaluated. Old records reviewed. Labs and imaging reviewed and results discussed with patient. Patient is a 30-year-old female, presenting with concerns for headache and upper back pain after a mechanical fall sustained a few days ago. Full HPI as detailed above. Upon arrival in the ED, vitals are remarkable for hypertension. Patient is resting comfortably and is in no acute distress. Neurologic exam is within normal limits. She is complaining of nausea and vomiting and ongoing headache since the incident. CT of the head negative for acute intracranial abnormalities. X-rays of the cervical and thoracic spine were performed and did not reveal any evidence of fracture. She does have evidence of some degenerative disc disease and spondylosis. Per chart review, she does follow with pain management for this. She has multiple drug allergies, did not feel it was appropriate to give her any narcotics here but advised her to follow-up with pain management unfortunately she cannot tolerate Lidoderm patches and states that she already takes muscle relaxers and cannot take NSAIDs. Patient will be discharged. She is comfortable in agreement with plan of care. Advise follow-up with PCP as needed. I, Dr. Severa Fort, MD, am the primary clinician of record. Is this patient to be included in the SEP-1 Core Measure? No   Exclusion criteria - the patient is NOT to be included for SEP-1 Core Measure due to: Infection is not suspected     During the patient's ED course, the patient was given:  Medications - No data to display     CLINICAL IMPRESSION  1. Fall, initial encounter    2. Closed head injury, initial encounter    3. Chronic midline back pain, unspecified back location        Blood pressure (!) 133/110, pulse 76, temperature 98.4 °F (36.9 °C), temperature source Oral, resp. rate 16, SpO2 97 %.     DISPOSITION  Bettye Mccoy was discharged to home in good condition. Patient was given scripts for the following medications. I counseled patient how to take these medications. Discharge Medication List as of 10/21/2022  1:58 PM          Follow-up with:  Felicita Felton MD  [facility] [phone]    Schedule an appointment as soon as possible for a visit       DISCLAIMER: This chart was created using Dragon dictation software. Efforts were made by me to ensure accuracy, however some errors may be present due to limitations of this technology and occasionally words are not transcribed correctly.        Mitali Mcfadden MD  10/21/22 5291

## 2023-01-19 ENCOUNTER — APPOINTMENT (OUTPATIENT)
Dept: GENERAL RADIOLOGY | Age: 38
End: 2023-01-19
Payer: MEDICARE

## 2023-01-19 ENCOUNTER — APPOINTMENT (OUTPATIENT)
Dept: CT IMAGING | Age: 38
End: 2023-01-19
Payer: MEDICARE

## 2023-01-19 ENCOUNTER — HOSPITAL ENCOUNTER (EMERGENCY)
Age: 38
Discharge: HOME | End: 2023-01-19
Attending: EMERGENCY MEDICINE
Payer: MEDICARE

## 2023-01-19 VITALS
OXYGEN SATURATION: 97 % | SYSTOLIC BLOOD PRESSURE: 168 MMHG | WEIGHT: 293 LBS | DIASTOLIC BLOOD PRESSURE: 96 MMHG | TEMPERATURE: 97.9 F | BODY MASS INDEX: 44.41 KG/M2 | RESPIRATION RATE: 18 BRPM | HEIGHT: 68 IN | HEART RATE: 70 BPM

## 2023-01-19 DIAGNOSIS — R41.82 ALTERED MENTAL STATUS, UNSPECIFIED ALTERED MENTAL STATUS TYPE: Primary | ICD-10-CM

## 2023-01-19 DIAGNOSIS — R10.11 ABDOMINAL PAIN, RIGHT UPPER QUADRANT: ICD-10-CM

## 2023-01-19 DIAGNOSIS — Z78.9 MEDICATION INTOLERANCE: ICD-10-CM

## 2023-01-19 LAB
A/G RATIO: 1.3 (ref 1.1–2.2)
ACETAMINOPHEN LEVEL: <5 UG/ML (ref 10–30)
ALBUMIN SERPL-MCNC: 3.9 G/DL (ref 3.4–5)
ALP BLD-CCNC: 67 U/L (ref 40–129)
ALT SERPL-CCNC: 70 U/L (ref 10–40)
AMMONIA: 37 UMOL/L (ref 11–51)
AMPHETAMINE SCREEN, URINE: ABNORMAL
ANION GAP SERPL CALCULATED.3IONS-SCNC: 11 MMOL/L (ref 3–16)
AST SERPL-CCNC: 64 U/L (ref 15–37)
BARBITURATE SCREEN URINE: ABNORMAL
BASOPHILS ABSOLUTE: 0.1 K/UL (ref 0–0.2)
BASOPHILS RELATIVE PERCENT: 1 %
BENZODIAZEPINE SCREEN, URINE: ABNORMAL
BILIRUB SERPL-MCNC: 0.5 MG/DL (ref 0–1)
BUN BLDV-MCNC: 9 MG/DL (ref 7–20)
CALCIUM SERPL-MCNC: 9.3 MG/DL (ref 8.3–10.6)
CANNABINOID SCREEN URINE: POSITIVE
CHLORIDE BLD-SCNC: 102 MMOL/L (ref 99–110)
CO2: 27 MMOL/L (ref 21–32)
COCAINE METABOLITE SCREEN URINE: ABNORMAL
CREAT SERPL-MCNC: <0.5 MG/DL (ref 0.6–1.1)
EOSINOPHILS ABSOLUTE: 0.3 K/UL (ref 0–0.6)
EOSINOPHILS RELATIVE PERCENT: 5 %
ETHANOL: NORMAL MG/DL (ref 0–0.08)
FENTANYL SCREEN, URINE: ABNORMAL
GFR SERPL CREATININE-BSD FRML MDRD: >60 ML/MIN/{1.73_M2}
GLUCOSE BLD-MCNC: 128 MG/DL (ref 70–99)
HCT VFR BLD CALC: 38.1 % (ref 36–48)
HEMOGLOBIN: 13.1 G/DL (ref 12–16)
LACTIC ACID: 1.9 MMOL/L (ref 0.4–2)
LYMPHOCYTES ABSOLUTE: 1.6 K/UL (ref 1–5.1)
LYMPHOCYTES RELATIVE PERCENT: 29.3 %
Lab: ABNORMAL
MCH RBC QN AUTO: 30.8 PG (ref 26–34)
MCHC RBC AUTO-ENTMCNC: 34.5 G/DL (ref 31–36)
MCV RBC AUTO: 89.3 FL (ref 80–100)
METHADONE SCREEN, URINE: ABNORMAL
MONOCYTES ABSOLUTE: 0.3 K/UL (ref 0–1.3)
MONOCYTES RELATIVE PERCENT: 5.3 %
NEUTROPHILS ABSOLUTE: 3.3 K/UL (ref 1.7–7.7)
NEUTROPHILS RELATIVE PERCENT: 59.4 %
OPIATE SCREEN URINE: ABNORMAL
OXYCODONE URINE: ABNORMAL
PDW BLD-RTO: 13.8 % (ref 12.4–15.4)
PH UA: 6
PHENCYCLIDINE SCREEN URINE: ABNORMAL
PLATELET # BLD: 188 K/UL (ref 135–450)
PMV BLD AUTO: 8.2 FL (ref 5–10.5)
POTASSIUM REFLEX MAGNESIUM: 3.8 MMOL/L (ref 3.5–5.1)
RBC # BLD: 4.27 M/UL (ref 4–5.2)
SALICYLATE, SERUM: <0.3 MG/DL (ref 15–30)
SODIUM BLD-SCNC: 140 MMOL/L (ref 136–145)
TOTAL PROTEIN: 7 G/DL (ref 6.4–8.2)
WBC # BLD: 5.6 K/UL (ref 4–11)

## 2023-01-19 PROCEDURE — 84443 ASSAY THYROID STIM HORMONE: CPT

## 2023-01-19 PROCEDURE — 85025 COMPLETE CBC W/AUTO DIFF WBC: CPT

## 2023-01-19 PROCEDURE — 80143 DRUG ASSAY ACETAMINOPHEN: CPT

## 2023-01-19 PROCEDURE — 36415 COLL VENOUS BLD VENIPUNCTURE: CPT

## 2023-01-19 PROCEDURE — 82140 ASSAY OF AMMONIA: CPT

## 2023-01-19 PROCEDURE — 99285 EMERGENCY DEPT VISIT HI MDM: CPT

## 2023-01-19 PROCEDURE — 80053 COMPREHEN METABOLIC PANEL: CPT

## 2023-01-19 PROCEDURE — 93005 ELECTROCARDIOGRAM TRACING: CPT | Performed by: EMERGENCY MEDICINE

## 2023-01-19 PROCEDURE — 6370000000 HC RX 637 (ALT 250 FOR IP): Performed by: EMERGENCY MEDICINE

## 2023-01-19 PROCEDURE — 80179 DRUG ASSAY SALICYLATE: CPT

## 2023-01-19 PROCEDURE — 82077 ASSAY SPEC XCP UR&BREATH IA: CPT

## 2023-01-19 PROCEDURE — 6360000004 HC RX CONTRAST MEDICATION: Performed by: EMERGENCY MEDICINE

## 2023-01-19 PROCEDURE — 83605 ASSAY OF LACTIC ACID: CPT

## 2023-01-19 PROCEDURE — 80307 DRUG TEST PRSMV CHEM ANLYZR: CPT

## 2023-01-19 PROCEDURE — 74177 CT ABD & PELVIS W/CONTRAST: CPT

## 2023-01-19 PROCEDURE — 71045 X-RAY EXAM CHEST 1 VIEW: CPT

## 2023-01-19 RX ORDER — ACETAMINOPHEN 325 MG/1
650 TABLET ORAL ONCE
Status: COMPLETED | OUTPATIENT
Start: 2023-01-19 | End: 2023-01-19

## 2023-01-19 RX ORDER — ONDANSETRON 4 MG/1
4 TABLET, ORALLY DISINTEGRATING ORAL ONCE
Status: COMPLETED | OUTPATIENT
Start: 2023-01-19 | End: 2023-01-19

## 2023-01-19 RX ORDER — TRAMADOL HYDROCHLORIDE 50 MG/1
50 TABLET ORAL ONCE
Status: COMPLETED | OUTPATIENT
Start: 2023-01-19 | End: 2023-01-19

## 2023-01-19 RX ADMIN — IOPAMIDOL 75 ML: 755 INJECTION, SOLUTION INTRAVENOUS at 18:47

## 2023-01-19 RX ADMIN — ONDANSETRON 4 MG: 4 TABLET, ORALLY DISINTEGRATING ORAL at 20:08

## 2023-01-19 RX ADMIN — TRAMADOL HYDROCHLORIDE 50 MG: 50 TABLET ORAL at 20:18

## 2023-01-19 RX ADMIN — ACETAMINOPHEN 650 MG: 325 TABLET ORAL at 20:18

## 2023-01-19 ASSESSMENT — PAIN DESCRIPTION - LOCATION: LOCATION: BACK

## 2023-01-19 ASSESSMENT — PAIN - FUNCTIONAL ASSESSMENT: PAIN_FUNCTIONAL_ASSESSMENT: 0-10

## 2023-01-19 ASSESSMENT — PAIN SCALES - GENERAL: PAINLEVEL_OUTOF10: 9

## 2023-01-19 NOTE — Clinical Note
Regina Izaguirre was seen and treated in our emergency department on 1/19/2023. She may return to work on 01/20/2023. If you have any questions or concerns, please don't hesitate to call.       Ángel Shipman MD

## 2023-01-19 NOTE — ED PROVIDER NOTES
[address] ENCOUNTER        Patient Name: Asaf Magana  MRN: [id number]  [place] 1985  Date of evaluation: 2023  Provider: Michelle Saenz MD  PCP: Radha Montana MD  Note Started: 6:35 PM EST 23    CHIEF COMPLAINT       Altered Mental Status (Pt states increased confusion x 3 days. Pt states that she has hx of liver cirrhosis. Pt also states mid back pain. )      HISTORY OF PRESENT ILLNESS: 1 or more Elements     History from : Patient    Limitations to history : None    Asaf Magana is a 30 y.o. female who presents for evaluation of altered mental status. It does not truly reflect altered mental status, patient states that she has felt confused over the past 3 days. She states that she was recently started on new medication, Trintellix, for her bipolar disorder. She does state that she has history of liver cirrhosis. She has not had any complications from liver cirrhosis. She states that she also has epigastric, right upper quadrant pain that radiates to the back. This has been ongoing for around the same period of time. No fevers. No blood in stools. No other medication changes. It was noted on phone notes that her GI doctor recommended to start nortriptyline however she has not started this. Nursing Notes were all reviewed and agreed with or any disagreements were addressed in the HPI. REVIEW OF SYSTEMS :      Review of Systems    Positives and Pertinent negatives as per HPI.      SURGICAL HISTORY     Past Surgical History:   Procedure Laterality Date    BACK SURGERY       SECTION, LOW TRANSVERSE      CHOLECYSTECTOMY      ENDOSCOPY, COLON, DIAGNOSTIC      FRACTURE SURGERY      KNEE SURGERY Left     UPPER GASTROINTESTINAL ENDOSCOPY N/A 2021    ESOPHAGOGASTRODUODENOSCOPY performed by Edgard Gibson MD at [address] [place] [address]       Discharge Medication List as of 2023  8:12 PM        CONTINUE these medications which have NOT CHANGED    Details   venlafaxine (EFFEXOR) 75 MG tablet Take 75 mg by mouth in the morning and 75 mg at noon and 75 mg before bedtime. Historical Med      ondansetron (ZOFRAN ODT) 4 MG disintegrating tablet Take 1 tablet by mouth every 8 hours as needed for Nausea, Disp-6 tablet, R-0Print      venlafaxine (EFFEXOR XR) 150 MG extended release capsule 150 mg  in the morning. Historical Med      tiZANidine (ZANAFLEX) 4 MG tablet nightlyHistorical Med      sucralfate (CARAFATE) 1 GM/10ML suspension 2 times dailyHistorical Med      rosuvastatin (CRESTOR) 40 MG tablet Take 40 mg by mouth dailyHistorical Med      promethazine (PHENERGAN) 25 MG tablet every 8 hours as neededHistorical Med      pantoprazole (PROTONIX) 40 MG tablet Take 40 mg by mouth 2 times dailyHistorical Med      nitroGLYCERIN (NITROSTAT) 0.4 MG SL tablet Place 0.4 mg under the tongueHistorical Med      melatonin 5 MG TABS tablet nightly as neededHistorical Med      loratadine (CLARITIN) 10 MG tablet Take 10 mg by mouth dailyHistorical Med      lisinopril-hydroCHLOROthiazide (PRINZIDE;ZESTORETIC) 20-25 MG per tablet Take 1 tablet by mouth dailyHistorical Med      linagliptin-metFORMIN (JENTADUETO) 2.5-1000 MG TABS Take 1 tablet by mouth 2 times dailyHistorical Med      ipratropium-albuterol (DUONEB) 0.5-2.5 (3) MG/3ML SOLN nebulizer solution Inhale 3 mLs into the lungs every 6 hours as neededHistorical Med      RELION INSULIN SYRINGE 31G X 15/64\" 0.5 ML MISC USE 1 SYRINGE THREE TIMES DAILY IN CASE OF PUMP FAILURE, DAWHistorical Med      HUMULIN R 500 UNIT/ML concentrated injection vial INJECT 300 UNITS SUBCUTANEOUSLY ONCE DAILY VIA INSULIN PUMP, DAWHistorical Med      Insulin Disposable Pump (OMNIPOD DASH 5 PACK PODS) MISC CHANGE EVERY 2 DAYS AS DIRECTEDHistorical Med      glipiZIDE (GLUCOTROL XL) 5 MG extended release tablet TAKE 1 TABLET BY MOUTH ONCE DAILY 30 MINUTES BEFORE BREAKFASTHistorical Med      gabapentin (NEURONTIN) 600 MG tablet Take 800 mg by mouth in the morning and 800 mg at noon and 800 mg in the evening and 800 mg before bedtime. Historical Med      furosemide (LASIX) 20 MG tablet take 1/2 to 1 tablet by mouth once daily if neededHistorical Med      diphenhydrAMINE (SOMINEX) 25 MG tablet Take 25 mg by mouth nightlyHistorical Med      dicyclomine (BENTYL) 20 MG tablet Take 20 mg by mouth 4 times daily as neededHistorical Med      Continuous Blood Gluc Transmit (DEXCOM G6 TRANSMITTER) MISC Historical Med      celecoxib (CELEBREX) 200 MG capsule 2 times dailyHistorical Med      SYMBICORT 160-4.5 MCG/ACT AERO 2 times daily, DAWHistorical Med      VENTOLIN  (90 Base) MCG/ACT inhaler every 6 hours as needed, DAWHistorical Med      medical marijuana Take by mouth as needed. Historical Med             ALLERGIES     Cefaclor, Cephalosporins, Haloperidol, Haloperidol lactate, Metoclopramide, Nabumetone, Prochlorperazine, Sumatriptan, Corticosteroids, Adhesive tape, Duloxetine, Duloxetine hcl, and Lidoderm [lidocaine]    FAMILYHISTORY     History reviewed. No pertinent family history. SOCIAL HISTORY       Social History     Tobacco Use    Smoking status: Every Day     Packs/day: 1.00     Years: 22.00     Pack years: 22.00     Types: Cigarettes    Smokeless tobacco: Former   Vaping Use    Vaping Use: Former   Substance Use Topics    Alcohol use: Not Currently    Drug use: Yes     Types: Marijuana (Weed)     Comment: medical/edible       SCREENINGS        Jerome Coma Scale  Eye Opening: Spontaneous  Best Verbal Response: Oriented  Best Motor Response: Obeys commands  Jerome Coma Scale Score: 15                CIWA Assessment  BP: (!) 168/96  Heart Rate: 70           PHYSICAL EXAM  1 or more Elements     ED Triage Vitals [01/19/23 1716]   BP Temp Temp Source Heart Rate Resp SpO2 Height Weight   (!) 178/91 97.9 °F (36.6 °C) Oral 78 18 99 % 5' 8\" (1.727 m) (!) 356 lb (161.5 kg)       General: No acute distress. Alert and Oriented. Appears stated age. HEENT: Full ROM of the neck. No difficulty tolerating oral secretions. Cardiac: Regular rate and rhythm. Radial pulses are intact bilaterally. Chest: No respiratory distress. Clear breath sounds bilaterally. No increased work of breathing. No use of accessory muscles for respiration. Abdomen: Soft, n there is mild epigastric tenderness, right upper quadrant tenderness nondistended, non-peritonitic. Extremities:No significant lower extremity edema. Lower extremities are symmetric. Neuro: Moving all extremities. No focal deficits. Speech is clear. Skin:No rash, no erythema  Psych: Calm and cooperative. DIAGNOSTIC RESULTS   LABS:    Labs Reviewed   COMPREHENSIVE METABOLIC PANEL W/ REFLEX TO MG FOR LOW K - Abnormal; Notable for the following components:       Result Value    Glucose 128 (*)     Creatinine <0.5 (*)     ALT 70 (*)     AST 64 (*)     All other components within normal limits   URINE DRUG SCREEN - Abnormal; Notable for the following components:    Cannabinoid Scrn, Ur POSITIVE (*)     All other components within normal limits   ACETAMINOPHEN LEVEL - Abnormal; Notable for the following components:    Acetaminophen Level <5 (*)     All other components within normal limits   SALICYLATE LEVEL - Abnormal; Notable for the following components:    Salicylate, Serum <5.1 (*)     All other components within normal limits   CBC WITH AUTO DIFFERENTIAL   LACTIC ACID   TSH WITH REFLEX   AMMONIA   ETHANOL   POCT GLUCOSE       When ordered only abnormal lab results are displayed. All other labs were within normal range or not returned as of this dictation. EKG  The EKG, as interpreted by myself, in the emergency department in the absence of a cardiologist.  normal sinus rhythm with a rate of 75  Axis is   Normal  QTc is   464  Intervals and Durations are unremarkable. No specific ST-T wave changes appreciated. No evidence of acute ischemia.    No significant change from prior EKG dated 7/19/22      RADIOLOGY:   Non-plain film images such as CT, Ultrasound and MRI are read by the radiologist. Plain radiographic images are visualized and preliminarily interpreted by the ED Provider with the below findings:    Chest x-ray without acute process    Interpretation per the Radiologist below, if available at the time of this note:    CT ABDOMEN PELVIS W IV CONTRAST Additional Contrast? None   Final Result   Status post cholecystectomy with no acute abnormality seen. Mild chronic liver changes with fatty replacement throughout. Mild splenomegaly. No hydronephrosis or renal stones and no urinary obstruction. No pelvic mass or active inflammation. XR CHEST PORTABLE   Final Result   No acute process. XR CHEST PORTABLE    Result Date: 1/19/2023  EXAMINATION: ONE XRAY VIEW OF THE CHEST 1/19/2023 5:37 pm COMPARISON: July 19, 2022 HISTORY: ORDERING SYSTEM PROVIDED HISTORY: Altered Mental Status TECHNOLOGIST PROVIDED HISTORY: Reason for exam:->Altered Mental Status Reason for Exam: AMS FINDINGS: The lungs are without acute focal process. There is no effusion or pneumothorax. The cardiomediastinal silhouette is without acute process. The osseous structures are without acute process. No acute process. Bedside Ultrasound, as interpreted by me, if performed:    No results found. PROCEDURES     Unless otherwise noted below, none     Procedures    CRITICAL CARE TIME     I personally spent a total of 0 minutes of critical care time in obtaining history, performing a physical exam, bedside monitoring of interventions, collecting and interpreting tests and discussion with consultants but excluding time spent performing procedures, treating other patients and teaching time.                                                                                                            PAST MEDICAL HISTORY      has a past medical history of Anxiety, Arthritis, Asthma, Bipolar 2 disorder ([facility] [address] COPD (chronic obstructive pulmonary disease) ([facility] [address] Diabetes mellitus ([address] Fibromyalgia, Hyperlipidemia, Hypertension, Liver cirrhosis ([address] MVP (mitral valve prolapse), Prolonged emergence from general anesthesia, and Sleep apnea. EMERGENCY DEPARTMENT COURSE and DIFFERENTIAL DIAGNOSIS/MDM:     Vitals:    Vitals:    01/19/23 1716 01/19/23 2008 01/19/23 2024   BP: (!) 178/91 (!) 153/101 (!) 168/96   Pulse: 78 71 70   Resp: 18 18 18   Temp: 97.9 °F (36.6 °C)     TempSrc: Oral     SpO2: 99% 96% 97%   Weight: (!) 356 lb (161.5 kg)     Height: 5' 8\" (1.727 m)         Patient was treated with and given the following medications:  Medications   iopamidol (ISOVUE-370) 76 % injection 75 mL (75 mLs IntraVENous Given 1/19/23 1847)   ondansetron (ZOFRAN-ODT) disintegrating tablet 4 mg (4 mg Oral Given 1/19/23 2008)   traMADol (ULTRAM) tablet 50 mg (50 mg Oral Given 1/19/23 2018)   acetaminophen (TYLENOL) tablet 650 mg (650 mg Oral Given 1/19/23 2018)             Is this patient to be included in the SEP-1 Core Measure due to severe sepsis or septic shock? No   Exclusion criteria - the patient is NOT to be included for SEP-1 Core Measure due to: Infection is not suspected    CC/HPI Summary, DDx, ED Course, and Reassessment:     68-year-old female presenting for evaluation of intermittent confusion ever since starting a new medication for her bipolar disorder. She arrives alert and oriented x3, no focal neurological deficits no signs or symptoms concerning for stroke or focal process. We will obtain metabolic, toxicologic work-up, CT abdomen pelvis because of her complaints of pain to ensure that there is no acute process to explain otherwise her altered mentation.   I did express with her my concern that this could be a medication side effect and for her to follow-up with her psychiatrist tomorrow to discuss whether these are expected side effects or she should stop taking this medicine. The differential diagnosis associated with the patient's presentation includes: Electrolyte abnormality, medication side effect, polypharmacy, infection, hyperammonemia, liver disease,    CONSULTS: (Who and What was discussed)  None    Discussion with Other Professionals : None      Social Determinants : None    Patient's care impacted by chronic condition(s): Liver disease, obesity, bipolar disorder    Records Reviewed : External ED Note [facility] emergency department notes from December 2022, patient was evaluated for epigastric pain and had unremarkable work-up at that time, was diagnosed with gastroenteritis. Clinical information obtained from an independent historian. Patient will be signed out to oncoming medical provider pending results of CT abdomen pelvis, laboratory evaluation. It is anticipated that if laboratory evaluation, imaging is unremarkable, patient will be discharged home with plans for follow-up with her psychiatrist.    I am the Primary Clinician of Record. FINAL IMPRESSION      1. Altered mental status, unspecified altered mental status type    2. Abdominal pain, right upper quadrant    3. Medication intolerance          DISPOSITION/PLAN     DISPOSITION Decision To Discharge 01/19/2023 08:12:29 PM      PATIENT REFERRED TO:  Pebbles Pavon MD  [facility] [phone]  [phone]    Schedule an appointment as soon as possible for a visit       DISCHARGE MEDICATIONS:  Patient was given scripts for the following medications.  I counseled patient how to take these medications:  Discharge Medication List as of 1/19/2023  8:12 PM          DISCONTINUED MEDICATIONS:  Discharge Medication List as of 1/19/2023  8:12 PM                 (This chart was generated in part by using Dragon Dictation system and may contain errors related to that system including errors in grammar, punctuation, and spelling, as well as words and phrases that may be inappropriate.  If there are any questions or concerns please feel free to contact the dictating provider for clarification.)    MD Stefan Abdalla MD  01/20/23 1166

## 2023-01-20 LAB
EKG ATRIAL RATE: 75 BPM
EKG DIAGNOSIS: NORMAL
EKG P AXIS: 67 DEGREES
EKG P-R INTERVAL: 186 MS
EKG Q-T INTERVAL: 416 MS
EKG QRS DURATION: 88 MS
EKG QTC CALCULATION (BAZETT): 464 MS
EKG R AXIS: 14 DEGREES
EKG T AXIS: 48 DEGREES
EKG VENTRICULAR RATE: 75 BPM
TSH REFLEX: 2.23 UIU/ML (ref 0.27–4.2)

## 2023-01-20 PROCEDURE — 93010 ELECTROCARDIOGRAM REPORT: CPT | Performed by: INTERNAL MEDICINE

## 2023-01-20 NOTE — DISCHARGE INSTRUCTIONS
As discussed, the intermittent confusion that you have may be related to the new medication you were put on for your bipolar disorder. Please call your psychiatrist tomorrow to discuss whether these are expected side effects or you should change this medicine.   Return for worsening symptoms

## 2023-01-20 NOTE — ED PROVIDER NOTES
Emergency Department Provider Note  Location: [place] [address] [facility],[address]      I assumed patient care at 1900 from Dr. Silvia Sepulveda. Please refer to his/her note for the history, physical exam, and initial medical decision making. Briefly, this is a 30 y.o. female here for intermittent confusion since she started on a new bipolar medication about 1 week ago and worse in the past 3 days. She has a history of liver cirrhosis. She also reports RUQ abdominal pain that radiates to to her right flank area. Here on exam, she is alert and oriented. She is not confused and able to provide her own history. At the time of sign out, CT abdomen/pelvis and urine drug screen was pending. Final ED Course and MDM:  Radiology  CT ABDOMEN PELVIS W IV CONTRAST Additional Contrast? None    Result Date: 1/19/2023  EXAMINATION: CT OF THE ABDOMEN AND PELVIS WITH CONTRAST 1/19/2023 6:37 pm TECHNIQUE: CT of the abdomen and pelvis was performed with the administration of intravenous contrast. Multiplanar reformatted images are provided for review. Automated exposure control, iterative reconstruction, and/or weight based adjustment of the mA/kV was utilized to reduce the radiation dose to as low as reasonably achievable. COMPARISON: None. HISTORY: ORDERING SYSTEM PROVIDED HISTORY: abdominal pain TECHNOLOGIST PROVIDED HISTORY: Additional Contrast?->None Reason for exam:->abdominal pain Decision Support Exception - unselect if not a suspected or confirmed emergency medical condition->Emergency Medical Condition (MA) Reason for Exam: Abdominal pain, hx of cirrhosis FINDINGS: Lower Chest:   The lung bases are clear. Organs: The liver mildly enlarged with hypertrophy of the caudate and left lobes and fatty replacement throughout. No focal lesion is seen. The gallbladder has been removed with clips in the gallbladder fossa. The bile ducts and pancreas are normal.  The spleen measures 18 cm in length and is normal density.   The adrenals are normal.  The kidneys are normal size and function normally with no hydronephrosis or renal stones. 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 The ureters are normal caliber. No adenopathy or ascites is seen. GI/Bowel: The appendix is normal.  There is mild feces scattered in the colon. The small bowel is normal caliber. The mesentery is unremarkable. Pelvis: The bladder is unremarkable. The uterus is a atrophic with no adnexal mass or free fluid no adenopathy or ascites is seen. Peritoneum/Retroperitoneum:   The aorta is normal caliber with no aneurysm or dissection and no retroperitoneal mass or adenopathy is seen. Bones/Soft Tissues: The bones are intact. No aggressive osseous lesion is seen. Status post cholecystectomy with no acute abnormality seen. Mild chronic liver changes with fatty replacement throughout. Mild splenomegaly. No hydronephrosis or renal stones and no urinary obstruction. No pelvic mass or active inflammation. XR CHEST PORTABLE    Result Date: 1/19/2023  EXAMINATION: ONE XRAY VIEW OF THE CHEST 1/19/2023 5:37 pm COMPARISON: July 19, 2022 HISTORY: ORDERING SYSTEM PROVIDED HISTORY: Altered Mental Status TECHNOLOGIST PROVIDED HISTORY: Reason for exam:->Altered Mental Status Reason for Exam: AMS FINDINGS: The lungs are without acute focal process. There is no effusion or pneumothorax. The cardiomediastinal silhouette is without acute process. The osseous structures are without acute process. No acute process.       Lab  Results for orders placed or performed during the hospital encounter of 01/19/23   CBC with Auto Differential   Result Value Ref Range    WBC 5.6 4.0 - 11.0 K/uL    RBC 4.27 4.00 - 5.20 M/uL    Hemoglobin 13.1 12.0 - 16.0 g/dL    Hematocrit 38.1 36.0 - 48.0 %    MCV 89.3 80.0 - 100.0 fL    MCH 30.8 26.0 - 34.0 pg    MCHC 34.5 31.0 - 36.0 g/dL    RDW 13.8 12.4 - 15.4 %    Platelets 638 159 - 005 K/uL    MPV 8.2 5.0 - 10.5 fL    Neutrophils % 59.4 % Lymphocytes % 29.3 %    Monocytes % 5.3 %    Eosinophils % 5.0 %    Basophils % 1.0 %    Neutrophils Absolute 3.3 1.7 - 7.7 K/uL    Lymphocytes Absolute 1.6 1.0 - 5.1 K/uL    Monocytes Absolute 0.3 0.0 - 1.3 K/uL    Eosinophils Absolute 0.3 0.0 - 0.6 K/uL    Basophils Absolute 0.1 0.0 - 0.2 K/uL   CMP w/ Reflex to MG   Result Value Ref Range    Sodium 140 136 - 145 mmol/L    Potassium reflex Magnesium 3.8 3.5 - 5.1 mmol/L    Chloride 102 99 - 110 mmol/L    CO2 27 21 - 32 mmol/L    Anion Gap 11 3 - 16    Glucose 128 (H) 70 - 99 mg/dL    BUN 9 7 - 20 mg/dL    Creatinine <0.5 (L) 0.6 - 1.1 mg/dL    Est, Glom Filt Rate >60 >60    Calcium 9.3 8.3 - 10.6 mg/dL    Total Protein 7.0 6.4 - 8.2 g/dL    Albumin 3.9 3.4 - 5.0 g/dL    Albumin/Globulin Ratio 1.3 1.1 - 2.2    Total Bilirubin 0.5 0.0 - 1.0 mg/dL    Alkaline Phosphatase 67 40 - 129 U/L    ALT 70 (H) 10 - 40 U/L    AST 64 (H) 15 - 37 U/L   Lactic Acid Now and in 2 Hours   Result Value Ref Range    Lactic Acid 1.9 0.4 - 2.0 mmol/L   Ammonia (select if history of liver disease or alcohol abuse)   Result Value Ref Range    Ammonia 37 11 - 51 umol/L   ETOH   Result Value Ref Range    Ethanol Lvl None Detected mg/dL   Urine Drug Screen   Result Value Ref Range    Amphetamine Screen, Urine Neg Negative <1000ng/mL    Barbiturate Screen, Ur Neg Negative <200 ng/mL    Benzodiazepine Screen, Urine Neg Negative <200 ng/mL    Cannabinoid Scrn, Ur POSITIVE (A) Negative <50 ng/mL    Cocaine Metabolite Screen, Urine Neg Negative <300 ng/mL    Opiate Scrn, Ur Neg Negative <300 ng/mL    PCP Screen, Urine Neg Negative <25 ng/mL    Methadone Screen, Urine Neg Negative <300 ng/mL    Oxycodone Urine Neg Negative <100 ng/ml    FENTANYL SCREEN, URINE Neg Negative <5 ng/mL    pH, UA 6.0     Drug Screen Comment: see below    Acetaminophen Level   Result Value Ref Range    Acetaminophen Level <5 (L) 10 - 30 ug/mL   Salicylate   Result Value Ref Range    Salicylate, Serum <5.7 (L) 15.0 - 30.0 mg/dL   EKG 12 Lead   Result Value Ref Range    Ventricular Rate 75 BPM    Atrial Rate 75 BPM    P-R Interval 186 ms    QRS Duration 88 ms    Q-T Interval 416 ms    QTc Calculation (Bazett) 464 ms    P Axis 67 degrees    R Axis 14 degrees    T Axis 48 degrees    Diagnosis       Normal sinus rhythmAnterior infarct (cited on or before 19-JUL-2022)Abnormal ECGWhen compared with ECG of 19-JUL-2022 19:07,Vent. rate has decreased BY  39 BPMCriteria for Inferior infarct are no longer Present         ED Medication Orders (From admission, onward)      Start Ordered     Status Ordering Provider    01/19/23 2030 01/19/23 2004  ondansetron (ZOFRAN-ODT) disintegrating tablet 4 mg  ONCE         Last MAR action: Given - by Nayla Hill on 01/19/23 at 2008 [facility]    01/19/23 2030 01/19/23 2011  traMADol (ULTRAM) tablet 50 mg  ONCE         Acknowledged [facility]    01/19/23 2030 01/19/23 2011  acetaminophen (TYLENOL) tablet 650 mg  ONCE         Acknowledged [facility]    01/19/23 1838 01/19/23 1838  iopamidol (ISOVUE-370) 76 % injection 75 mL  IMG ONCE PRN         Last MAR action: Given - by Didi Parekh on 01/19/23 at 1847 Fernanda Morrison          I discussed the imaging and laboratory results with the patient. I agree with Dr. Saurabh Rios assessment the patient is alert and oriented and able to comprehend everything we were discussing. She states she has some nausea associated with her abdominal pain and requested for nausea medicine. Prior to going home, she also requested some pain medicine. She states she has these medications at home but since she has been in our ED, she has not had any medicine. She specifically requested no NSAIDs secondary to her liver disease. With regards to her symptoms that Corid related to the time she started a new medicine, I told her to discuss with her physician who prescribed her bipolar medication to see if she should switch versus give it a little longer.     I estimate there is LOW risk for ACUTE APPENDICITIS, BOWEL OBSTRUCTION, CHOLECYSTITIS, COMPLICATED DIVERTICULITIS, INCARCERATED HERNIA, PANCREATITIS, PELVIC INFLAMMATORY DISEASE, PERFORATED BOWEL or ULCER, ECTOPIC PREGNANCY, or TUBO-OVARIAN ABSCESS, thus I consider the discharge disposition reasonable. Also, there is no evidence or peritonitis, sepsis, or toxicity. Radha Srivastava and I have discussed the diagnosis and risks, and we agree with discharging home to follow-up with PCP. We also discussed returning to the Emergency Department immediately if new or worsening symptoms occur. We have discussed the symptoms which are most concerning (e.g., bloody stool, fever, changing or worsening pain, vomiting) that necessitate immediate return. I estimate there is LOW risk for ACUTE CORONARY SYNDROME, INTRACRANIAL HEMORRHAGE, MALIGNANT DYSRHYTHMIA, MENINGITIS, PNEUMONIA, PULMONARY EMBOLISM, SEPSIS, SUBARACHNOID HEMORRHAGE, SUBDURAL HEMATOMA, or STROKE, thus I consider the discharge disposition reasonable. Radha Srivastava and I have discussed the diagnosis and risks, and we agree with discharging home to follow-up with PCP We also discussed returning to the Emergency Department immediately if new or worsening symptoms occur. We have discussed the symptoms which are most concerning (e.g., changing or worsening pain, weakness, vomiting, fever) that necessitate immediate return. Clinical Impression:  1. Altered mental status, unspecified altered mental status type    2. Abdominal pain, right upper quadrant    3. Medication intolerance          Disposition:  Discharge to home in good condition. Blood pressure (!) 153/101, pulse 71, temperature 97.9 °F (36.6 °C), temperature source Oral, resp. rate 18, height 5' 8\" (1.727 m), weight (!) 356 lb (161.5 kg), SpO2 96 %.        Disposition referral (if applicable):  Mihai Haskins MD  [phone]    Schedule an appointment as soon as possible for a visit           Total critical care time is 0 minutes, which excludes separately billable procedures and updating family. Time spent is specifically for management of the presenting complaint and symptoms initially, direct bedside care, reevaluation, review of records, and consultation. There was a high probability of clinically significant life-threatening deterioration in the patient's condition, which required my urgent intervention.      Justus Spears MD  [address] [place] [address] Christian Manning MD  01/19/23 2021

## 2023-10-08 ENCOUNTER — HOSPITAL ENCOUNTER (EMERGENCY)
Age: 38
Discharge: HOME | End: 2023-10-09
Attending: EMERGENCY MEDICINE
Payer: MEDICARE

## 2023-10-08 DIAGNOSIS — R10.11 ABDOMINAL PAIN, RIGHT UPPER QUADRANT: Primary | ICD-10-CM

## 2023-10-08 PROCEDURE — 99284 EMERGENCY DEPT VISIT MOD MDM: CPT

## 2023-10-08 RX ORDER — GABAPENTIN 800 MG/1
800 TABLET ORAL EVERY 6 HOURS
COMMUNITY
Start: 2023-09-20

## 2023-10-08 RX ORDER — GLIPIZIDE 5 MG/1
TABLET ORAL
COMMUNITY
Start: 2023-09-30

## 2023-10-08 RX ORDER — VORTIOXETINE 10 MG/1
10 TABLET, FILM COATED ORAL
COMMUNITY
Start: 2023-10-02

## 2023-10-08 RX ORDER — RIMEGEPANT SULFATE 75 MG/75MG
TABLET, ORALLY DISINTEGRATING ORAL
COMMUNITY
Start: 2023-03-27

## 2023-10-08 RX ORDER — METHOCARBAMOL 750 MG/1
TABLET, FILM COATED ORAL
COMMUNITY
Start: 2023-09-25

## 2023-10-08 RX ORDER — PROPRANOLOL HYDROCHLORIDE 80 MG/1
CAPSULE, EXTENDED RELEASE ORAL
COMMUNITY
Start: 2023-09-26

## 2023-10-08 RX ORDER — ONDANSETRON 2 MG/ML
4 INJECTION INTRAMUSCULAR; INTRAVENOUS ONCE
Status: COMPLETED | OUTPATIENT
Start: 2023-10-09 | End: 2023-10-09

## 2023-10-08 RX ORDER — PROMETHAZINE HYDROCHLORIDE 25 MG/ML
6.25 INJECTION, SOLUTION INTRAMUSCULAR; INTRAVENOUS ONCE
Status: DISCONTINUED | OUTPATIENT
Start: 2023-10-09 | End: 2023-10-08

## 2023-10-08 RX ORDER — VORTIOXETINE 5 MG/1
5 TABLET, FILM COATED ORAL
COMMUNITY
Start: 2023-10-02

## 2023-10-08 RX ORDER — KETOROLAC TROMETHAMINE 30 MG/ML
30 INJECTION, SOLUTION INTRAMUSCULAR; INTRAVENOUS ONCE
Status: COMPLETED | OUTPATIENT
Start: 2023-10-09 | End: 2023-10-09

## 2023-10-08 RX ORDER — QUETIAPINE FUMARATE 50 MG/1
50 TABLET, FILM COATED ORAL
COMMUNITY
Start: 2023-09-28

## 2023-10-08 RX ORDER — LACTULOSE 10 G/15ML
SOLUTION ORAL
COMMUNITY
Start: 2023-10-03

## 2023-10-08 RX ORDER — 0.9 % SODIUM CHLORIDE 0.9 %
1000 INTRAVENOUS SOLUTION INTRAVENOUS ONCE
Status: COMPLETED | OUTPATIENT
Start: 2023-10-09 | End: 2023-10-09

## 2023-10-08 RX ORDER — DICYCLOMINE HYDROCHLORIDE 10 MG/1
10 CAPSULE ORAL 4 TIMES DAILY
COMMUNITY
Start: 2023-08-22

## 2023-10-08 ASSESSMENT — PAIN DESCRIPTION - LOCATION: LOCATION: ABDOMEN

## 2023-10-08 ASSESSMENT — PAIN DESCRIPTION - DESCRIPTORS: DESCRIPTORS: SQUEEZING;SHARP

## 2023-10-08 ASSESSMENT — PAIN DESCRIPTION - PAIN TYPE: TYPE: ACUTE PAIN

## 2023-10-08 ASSESSMENT — PAIN - FUNCTIONAL ASSESSMENT: PAIN_FUNCTIONAL_ASSESSMENT: 0-10

## 2023-10-08 ASSESSMENT — PAIN SCALES - GENERAL: PAINLEVEL_OUTOF10: 9

## 2023-10-09 VITALS
HEIGHT: 68 IN | DIASTOLIC BLOOD PRESSURE: 72 MMHG | HEART RATE: 86 BPM | WEIGHT: 293 LBS | OXYGEN SATURATION: 99 % | BODY MASS INDEX: 44.41 KG/M2 | TEMPERATURE: 98.4 F | RESPIRATION RATE: 16 BRPM | SYSTOLIC BLOOD PRESSURE: 150 MMHG

## 2023-10-09 LAB
ALBUMIN SERPL-MCNC: 4.4 G/DL (ref 3.4–5)
ALBUMIN/GLOB SERPL: 1.4 {RATIO} (ref 1.1–2.2)
ALP SERPL-CCNC: 104 U/L (ref 40–129)
ALT SERPL-CCNC: 46 U/L (ref 10–40)
AMMONIA PLAS-SCNC: <10 UMOL/L (ref 11–51)
AMPHETAMINES UR QL SCN>1000 NG/ML: ABNORMAL
ANION GAP SERPL CALCULATED.3IONS-SCNC: 11 MMOL/L (ref 3–16)
AST SERPL-CCNC: 22 U/L (ref 15–37)
BARBITURATES UR QL SCN>200 NG/ML: ABNORMAL
BASOPHILS # BLD: 0.1 K/UL (ref 0–0.2)
BASOPHILS NFR BLD: 0.7 %
BENZODIAZ UR QL SCN>200 NG/ML: ABNORMAL
BILIRUB SERPL-MCNC: 0.6 MG/DL (ref 0–1)
BILIRUB UR QL STRIP.AUTO: NEGATIVE
BUN SERPL-MCNC: 8 MG/DL (ref 7–20)
CALCIUM SERPL-MCNC: 9.4 MG/DL (ref 8.3–10.6)
CANNABINOIDS UR QL SCN>50 NG/ML: POSITIVE
CHLORIDE SERPL-SCNC: 101 MMOL/L (ref 99–110)
CLARITY UR: CLEAR
CO2 SERPL-SCNC: 27 MMOL/L (ref 21–32)
COCAINE UR QL SCN: ABNORMAL
COLOR UR: YELLOW
CREAT SERPL-MCNC: <0.5 MG/DL (ref 0.6–1.1)
DEPRECATED RDW RBC AUTO: 13.3 % (ref 12.4–15.4)
DRUG SCREEN COMMENT UR-IMP: ABNORMAL
EOSINOPHIL # BLD: 0.3 K/UL (ref 0–0.6)
EOSINOPHIL NFR BLD: 4.3 %
FENTANYL SCREEN, URINE: ABNORMAL
GFR SERPLBLD CREATININE-BSD FMLA CKD-EPI: >60 ML/MIN/{1.73_M2}
GLUCOSE SERPL-MCNC: 193 MG/DL (ref 70–99)
GLUCOSE UR STRIP.AUTO-MCNC: 500 MG/DL
HCG SERPL QL: NEGATIVE
HCT VFR BLD AUTO: 41.7 % (ref 36–48)
HGB BLD-MCNC: 14.7 G/DL (ref 12–16)
HGB UR QL STRIP.AUTO: NEGATIVE
KETONES UR STRIP.AUTO-MCNC: NEGATIVE MG/DL
LEUKOCYTE ESTERASE UR QL STRIP.AUTO: NEGATIVE
LYMPHOCYTES # BLD: 2.6 K/UL (ref 1–5.1)
LYMPHOCYTES NFR BLD: 36.1 %
MAGNESIUM SERPL-MCNC: 1.8 MG/DL (ref 1.8–2.4)
MCH RBC QN AUTO: 32 PG (ref 26–34)
MCHC RBC AUTO-ENTMCNC: 35.2 G/DL (ref 31–36)
MCV RBC AUTO: 90.9 FL (ref 80–100)
METHADONE UR QL SCN>300 NG/ML: ABNORMAL
MONOCYTES # BLD: 0.4 K/UL (ref 0–1.3)
MONOCYTES NFR BLD: 5.5 %
NEUTROPHILS # BLD: 3.8 K/UL (ref 1.7–7.7)
NEUTROPHILS NFR BLD: 53.4 %
NITRITE UR QL STRIP.AUTO: NEGATIVE
OPIATES UR QL SCN>300 NG/ML: ABNORMAL
OXYCODONE UR QL SCN: ABNORMAL
PCP UR QL SCN>25 NG/ML: ABNORMAL
PH UR STRIP.AUTO: 6 [PH] (ref 5–8)
PH UR STRIP: 6 [PH]
PLATELET # BLD AUTO: 191 K/UL (ref 135–450)
PMV BLD AUTO: 8.7 FL (ref 5–10.5)
POTASSIUM SERPL-SCNC: 3.8 MMOL/L (ref 3.5–5.1)
PROT SERPL-MCNC: 7.6 G/DL (ref 6.4–8.2)
PROT UR STRIP.AUTO-MCNC: NEGATIVE MG/DL
RBC # BLD AUTO: 4.59 M/UL (ref 4–5.2)
SODIUM SERPL-SCNC: 139 MMOL/L (ref 136–145)
SP GR UR STRIP.AUTO: >=1.03 (ref 1–1.03)
UA COMPLETE W REFLEX CULTURE PNL UR: ABNORMAL
UA DIPSTICK W REFLEX MICRO PNL UR: ABNORMAL
URN SPEC COLLECT METH UR: ABNORMAL
UROBILINOGEN UR STRIP-ACNC: 1 E.U./DL
WBC # BLD AUTO: 7.2 K/UL (ref 4–11)

## 2023-10-09 PROCEDURE — 83735 ASSAY OF MAGNESIUM: CPT

## 2023-10-09 PROCEDURE — 36415 COLL VENOUS BLD VENIPUNCTURE: CPT

## 2023-10-09 PROCEDURE — 96374 THER/PROPH/DIAG INJ IV PUSH: CPT

## 2023-10-09 PROCEDURE — 80307 DRUG TEST PRSMV CHEM ANLYZR: CPT

## 2023-10-09 PROCEDURE — 6360000002 HC RX W HCPCS: Performed by: EMERGENCY MEDICINE

## 2023-10-09 PROCEDURE — 96375 TX/PRO/DX INJ NEW DRUG ADDON: CPT

## 2023-10-09 PROCEDURE — 2580000003 HC RX 258: Performed by: EMERGENCY MEDICINE

## 2023-10-09 PROCEDURE — 81003 URINALYSIS AUTO W/O SCOPE: CPT

## 2023-10-09 PROCEDURE — 84703 CHORIONIC GONADOTROPIN ASSAY: CPT

## 2023-10-09 PROCEDURE — 6370000000 HC RX 637 (ALT 250 FOR IP): Performed by: EMERGENCY MEDICINE

## 2023-10-09 PROCEDURE — 80053 COMPREHEN METABOLIC PANEL: CPT

## 2023-10-09 PROCEDURE — 85025 COMPLETE CBC W/AUTO DIFF WBC: CPT

## 2023-10-09 PROCEDURE — 82140 ASSAY OF AMMONIA: CPT

## 2023-10-09 RX ORDER — MORPHINE SULFATE 4 MG/ML
4 INJECTION, SOLUTION INTRAMUSCULAR; INTRAVENOUS ONCE
Status: COMPLETED | OUTPATIENT
Start: 2023-10-09 | End: 2023-10-09

## 2023-10-09 RX ORDER — PROMETHAZINE HYDROCHLORIDE 25 MG/1
25 TABLET ORAL ONCE
Status: COMPLETED | OUTPATIENT
Start: 2023-10-09 | End: 2023-10-09

## 2023-10-09 RX ORDER — HYDROCODONE BITARTRATE AND ACETAMINOPHEN 5; 325 MG/1; MG/1
1 TABLET ORAL ONCE
Status: COMPLETED | OUTPATIENT
Start: 2023-10-09 | End: 2023-10-09

## 2023-10-09 RX ADMIN — PROMETHAZINE HYDROCHLORIDE 25 MG: 25 TABLET ORAL at 01:21

## 2023-10-09 RX ADMIN — HYDROCODONE BITARTRATE AND ACETAMINOPHEN 1 TABLET: 5; 325 TABLET ORAL at 01:04

## 2023-10-09 RX ADMIN — ONDANSETRON 4 MG: 2 INJECTION INTRAMUSCULAR; INTRAVENOUS at 00:01

## 2023-10-09 RX ADMIN — SODIUM CHLORIDE 1000 ML: 9 INJECTION, SOLUTION INTRAVENOUS at 00:01

## 2023-10-09 RX ADMIN — KETOROLAC TROMETHAMINE 30 MG: 30 INJECTION, SOLUTION INTRAMUSCULAR; INTRAVENOUS at 00:01

## 2023-10-09 RX ADMIN — MORPHINE SULFATE 4 MG: 4 INJECTION, SOLUTION INTRAMUSCULAR; INTRAVENOUS at 01:22

## 2023-10-09 ASSESSMENT — PAIN SCALES - GENERAL
PAINLEVEL_OUTOF10: 9
PAINLEVEL_OUTOF10: 7
PAINLEVEL_OUTOF10: 7

## 2023-10-09 ASSESSMENT — ENCOUNTER SYMPTOMS
RHINORRHEA: 0
COUGH: 0
SHORTNESS OF BREATH: 0
BACK PAIN: 0
NAUSEA: 1
VOMITING: 1
DIARRHEA: 0
ABDOMINAL PAIN: 1
CHEST TIGHTNESS: 0

## 2023-10-09 NOTE — ED PROVIDER NOTES
Emergency Department Attending Physician Note  Location: [facility] EMERGENCY DEPARTMENT  10/8/2023       Pt Name: Erickson Nino  MRN: [id number]  [address] [facility] 1985    Date of evaluation: 10/8/2023  Provider: Antoine Jensen DO  PCP: Yamil Martinez MD    Note Started: 11:32 PM EDT 10/8/23    CHIEF COMPLAINT  Chief Complaint   Patient presents with    Abdominal Pain       HISTORY OF PRESENT ILLNESS:  History obtained by patient. Limitations to history : None. Erickson Nino is a 41 y.o. female with a significant PMHx of anxiety, bipolar 2, COPD, fibromyalgia, cirrhosis, chronic abdominal pain on Zofran, Phenergan, Protonix, and other comorbidities as listed below, presenting emergency department today with concerns of RUQ abdominal pain and what she describes as dark urine. Says this is started about 2 days ago, and having a bowel movement and even belching has not helped. Patient says that she has this multiple times in the past, but she took her normal Phenergan prior to arrival, did not help. Additionally, she says that she is living on a fixed income right now, and cannot afford her medical marijuana, and does not have that either. Further, she has been having muscle aches, thinks her magnesium is low, but did not take any magnesium because she cannot afford to do so because insurance does not cover it. Says that she has \"pain in my lady parts,\" when she urinates. No known history of UTIs. Says she has had a kidney stone in the distant past.  This does not feel like that. No falls or injuries. Has had intermittent episodes of nausea and vomiting couple of weeks, and did vomit twice. Patient has quite the extensive allergy list, including Phenergan, but she says she took Phenergan prior to arrival.  She is asked me for IM Phenergan here, but I do not have that in the stand-alone emergency department. Had allergies to Compazine and Reglan for anxiety.     Recently saw hepatology/GI at [facility]

## (undated) DEVICE — FORCEPS BX L240CM JAW DIA2.8MM L CAP W/ NDL MIC MESH TOOTH

## (undated) DEVICE — MASK CAPNOGRAPHY AD W35IN DIA58IN SAMP LN L10FT O2 LN